# Patient Record
Sex: FEMALE | Race: WHITE | NOT HISPANIC OR LATINO | Employment: OTHER | URBAN - METROPOLITAN AREA
[De-identification: names, ages, dates, MRNs, and addresses within clinical notes are randomized per-mention and may not be internally consistent; named-entity substitution may affect disease eponyms.]

---

## 2017-01-04 ENCOUNTER — LAB (OUTPATIENT)
Dept: INTERNAL MEDICINE | Facility: CLINIC | Age: 57
End: 2017-01-04

## 2017-01-04 DIAGNOSIS — Z11.59 SCREENING FOR VIRAL DISEASE: ICD-10-CM

## 2017-01-04 DIAGNOSIS — Z13.29 SCREENING FOR ENDOCRINE DISORDER: ICD-10-CM

## 2017-01-04 DIAGNOSIS — Z00.00 ANNUAL PHYSICAL EXAM: ICD-10-CM

## 2017-01-04 DIAGNOSIS — R53.82 CHRONIC FATIGUE: ICD-10-CM

## 2017-01-04 LAB
ALBUMIN SERPL-MCNC: 3.91 G/DL (ref 3.4–4.6)
ALBUMIN/GLOB SERPL: 1.2 G/DL
ALP SERPL-CCNC: 70 U/L (ref 46–116)
ALT SERPL W P-5'-P-CCNC: 20 U/L (ref 14–59)
ANION GAP SERPL CALCULATED.3IONS-SCNC: 8 MMOL/L
AST SERPL-CCNC: 16 U/L (ref 7–37)
BASOPHILS # BLD AUTO: 0.07 10*3/MM3 (ref 0–0.2)
BASOPHILS NFR BLD AUTO: 0.7 % (ref 0–2)
BILIRUB SERPL-MCNC: 0.5 MG/DL (ref 0.2–1)
BUN BLD-MCNC: 16 MG/DL (ref 6–22)
BUN/CREAT SERPL: 21.3 (ref 7–25)
CALCIUM SPEC-SCNC: 9.6 MG/DL (ref 8.6–10.5)
CHLORIDE SERPL-SCNC: 101 MMOL/L (ref 95–107)
CHOLEST SERPL-MCNC: 239 MG/DL (ref 0–200)
CO2 SERPL-SCNC: 31 MMOL/L (ref 23–32)
CREAT BLD-MCNC: 0.75 MG/DL (ref 0.55–1.02)
DEPRECATED RDW RBC AUTO: 46.1 FL (ref 37–54)
EOSINOPHIL # BLD AUTO: 0.1 10*3/MM3 (ref 0–0.7)
EOSINOPHIL NFR BLD AUTO: 1.1 % (ref 0–5)
ERYTHROCYTE [DISTWIDTH] IN BLOOD BY AUTOMATED COUNT: 13.7 % (ref 11.5–15)
FERRITIN SERPL-MCNC: 114.7 NG/ML (ref 13–150)
GFR SERPL CREATININE-BSD FRML MDRD: 80 ML/MIN/1.73
GLOBULIN UR ELPH-MCNC: 3.2 GM/DL
GLUCOSE BLD-MCNC: 96 MG/DL (ref 70–100)
HCT VFR BLD AUTO: 43.6 % (ref 34.1–44.9)
HDLC SERPL-MCNC: 73 MG/DL (ref 40–81)
HGB BLD-MCNC: 14 G/DL (ref 11.2–15.7)
LDLC SERPL CALC-MCNC: 154 MG/DL (ref 0–100)
LDLC/HDLC SERPL: 2.11 {RATIO}
LYMPHOCYTES # BLD AUTO: 2.81 10*3/MM3 (ref 0.8–7)
LYMPHOCYTES NFR BLD AUTO: 29.8 % (ref 10–60)
MCH RBC QN AUTO: 30 PG (ref 26–34)
MCHC RBC AUTO-ENTMCNC: 32.1 G/DL (ref 31–37)
MCV RBC AUTO: 93.6 FL (ref 80–100)
MONOCYTES # BLD AUTO: 0.73 10*3/MM3 (ref 0–1)
MONOCYTES NFR BLD AUTO: 7.7 % (ref 0–13)
NEUTROPHILS # BLD AUTO: 5.72 10*3/MM3 (ref 1–11)
NEUTROPHILS NFR BLD AUTO: 60.7 % (ref 30–85)
PLATELET # BLD AUTO: 614 10*3/MM3 (ref 150–450)
PMV BLD AUTO: 11.4 FL (ref 6–12)
POTASSIUM BLD-SCNC: 4.8 MMOL/L (ref 3.3–5.3)
PROT SERPL-MCNC: 7.1 G/DL (ref 6.3–8.4)
RBC # BLD AUTO: 4.66 10*6/MM3 (ref 3.93–5.22)
SODIUM BLD-SCNC: 140 MMOL/L (ref 136–145)
TRIGL SERPL-MCNC: 59 MG/DL (ref 0–150)
TSH SERPL DL<=0.05 MIU/L-ACNC: 0.94 MIU/ML (ref 0.4–4.2)
VLDLC SERPL-MCNC: 11.8 MG/DL
WBC NRBC COR # BLD: 9.43 10*3/MM3 (ref 5–10)

## 2017-01-04 PROCEDURE — 84443 ASSAY THYROID STIM HORMONE: CPT | Performed by: INTERNAL MEDICINE

## 2017-01-04 PROCEDURE — 80053 COMPREHEN METABOLIC PANEL: CPT | Performed by: INTERNAL MEDICINE

## 2017-01-04 PROCEDURE — 85025 COMPLETE CBC W/AUTO DIFF WBC: CPT | Performed by: INTERNAL MEDICINE

## 2017-01-04 PROCEDURE — 80061 LIPID PANEL: CPT | Performed by: INTERNAL MEDICINE

## 2017-01-05 LAB
25(OH)D3 SERPL-MCNC: 75 NG/ML (ref 30–100)
HCV AB S/CO SERPL IA: <0.1 S/CO RATIO (ref 0–0.9)

## 2017-01-08 DIAGNOSIS — D75.839 THROMBOCYTOSIS: Primary | ICD-10-CM

## 2017-01-10 ENCOUNTER — TELEPHONE (OUTPATIENT)
Dept: INTERNAL MEDICINE | Facility: CLINIC | Age: 57
End: 2017-01-10

## 2017-01-10 NOTE — TELEPHONE ENCOUNTER
----- Message from Fadumo Anglin sent at 1/10/2017  3:55 PM EST -----  Contact: pt - Dr Young's pt - RE: lab appt  [facility] for pt to return call to schedule lab appt.

## 2017-02-03 ENCOUNTER — LAB (OUTPATIENT)
Dept: INTERNAL MEDICINE | Facility: CLINIC | Age: 57
End: 2017-02-03

## 2017-02-03 DIAGNOSIS — D75.839 THROMBOCYTOSIS: ICD-10-CM

## 2017-02-03 LAB
BASOPHILS # BLD AUTO: 0.07 10*3/MM3 (ref 0–0.2)
BASOPHILS NFR BLD AUTO: 1.1 % (ref 0–2)
DEPRECATED RDW RBC AUTO: 45.6 FL (ref 37–54)
EOSINOPHIL # BLD AUTO: 0.18 10*3/MM3 (ref 0–0.7)
EOSINOPHIL NFR BLD AUTO: 2.8 % (ref 0–5)
ERYTHROCYTE [DISTWIDTH] IN BLOOD BY AUTOMATED COUNT: 13.6 % (ref 11.5–15)
HCT VFR BLD AUTO: 42.7 % (ref 34.1–44.9)
HGB BLD-MCNC: 13.8 G/DL (ref 11.2–15.7)
LYMPHOCYTES # BLD AUTO: 2.44 10*3/MM3 (ref 0.8–7)
LYMPHOCYTES NFR BLD AUTO: 37.9 % (ref 10–60)
MCH RBC QN AUTO: 30.3 PG (ref 26–34)
MCHC RBC AUTO-ENTMCNC: 32.3 G/DL (ref 31–37)
MCV RBC AUTO: 93.8 FL (ref 80–100)
MONOCYTES # BLD AUTO: 0.81 10*3/MM3 (ref 0–1)
MONOCYTES NFR BLD AUTO: 12.6 % (ref 0–13)
NEUTROPHILS # BLD AUTO: 2.94 10*3/MM3 (ref 1–11)
NEUTROPHILS NFR BLD AUTO: 45.6 % (ref 30–85)
PLATELET # BLD AUTO: 621 10*3/MM3 (ref 150–450)
PMV BLD AUTO: 10.9 FL (ref 6–12)
RBC # BLD AUTO: 4.55 10*6/MM3 (ref 3.93–5.22)
WBC NRBC COR # BLD: 6.44 10*3/MM3 (ref 5–10)

## 2017-02-03 PROCEDURE — 36415 COLL VENOUS BLD VENIPUNCTURE: CPT | Performed by: INTERNAL MEDICINE

## 2017-02-03 PROCEDURE — 85025 COMPLETE CBC W/AUTO DIFF WBC: CPT | Performed by: INTERNAL MEDICINE

## 2017-02-09 ENCOUNTER — TELEPHONE (OUTPATIENT)
Dept: INTERNAL MEDICINE | Facility: CLINIC | Age: 57
End: 2017-02-09

## 2017-02-09 DIAGNOSIS — D75.839 THROMBOCYTOSIS: Primary | ICD-10-CM

## 2017-02-09 DIAGNOSIS — R79.89 HIGH PLATELET COUNT: Primary | ICD-10-CM

## 2017-02-09 NOTE — TELEPHONE ENCOUNTER
----- Message from Dorota Young MD sent at 2/9/2017 11:52 AM EST -----  Regarding: high platelet count  I left message for her to call back.  Platelets are high- she will need to see hematologist if this is much worse from her past.  Splenectomy may increase platelets - I need cbc from last several yrs to see if any change.

## 2017-02-10 NOTE — PROGRESS NOTES
She had platelet counts with dr gonzalez in past (oncology) - pls get those for comparison with recent cbc

## 2017-02-14 ENCOUNTER — TELEPHONE (OUTPATIENT)
Dept: INTERNAL MEDICINE | Facility: CLINIC | Age: 57
End: 2017-02-14

## 2017-02-14 NOTE — TELEPHONE ENCOUNTER
----- Message from Shavonne Gardner sent at 2/14/2017 11:15 AM EST -----  Contact: Patient  Patient called.  She states she talked with Dr. Young this weekend, and wanted to notify you that patient has no older CBCs.  Patient states Dr. Young told her to contact you this week when she was out of town about scheduling with a blood specialist.  Patient would like a return call from you.  Please advise.     Patient:  [phone]    Thanks.

## 2017-02-15 ENCOUNTER — TELEPHONE (OUTPATIENT)
Dept: INTERNAL MEDICINE | Facility: CLINIC | Age: 57
End: 2017-02-15

## 2017-02-15 NOTE — TELEPHONE ENCOUNTER
----- Message from Sari Cevallos sent at 2/15/2017  8:21 AM EST -----  Von-Pt returning your call. You were not available.  Pt said she cannot receive calls at her desk at work.  She will get her next break at 11:00 if you can call her then or she will call back.    Pt# [phone]

## 2017-02-15 NOTE — TELEPHONE ENCOUNTER
Ms. Dias returned my call back in regards to a message that Dr. Young left her  and she informed me that she had mailed some records to our office about two weeks ago with a handwritten note that contained some names of physicians she has seen along w/ other reports. I told her that I would speak to our medical records department in regards to this.     She informed me that she has been followed by some physicians at [facility]. I informed her that I will be able to retrieve these records via [facility] since the two hospitals have access to the same EHR system.     She stated she would like to be referred out to hematology (preferbably [facility]) for further evaluation due to the high platelet counts. I told her that we would georgina the referral in and get her set up to see one the providers there. She voiced she understood and thank you.

## 2017-03-08 DIAGNOSIS — R89.9 ABNORMAL LABORATORY TEST RESULT: Primary | ICD-10-CM

## 2017-03-10 ENCOUNTER — LAB (OUTPATIENT)
Dept: ONCOLOGY | Facility: HOSPITAL | Age: 57
End: 2017-03-10

## 2017-03-10 ENCOUNTER — CONSULT (OUTPATIENT)
Dept: ONCOLOGY | Facility: CLINIC | Age: 57
End: 2017-03-10

## 2017-03-10 VITALS
DIASTOLIC BLOOD PRESSURE: 66 MMHG | HEART RATE: 61 BPM | OXYGEN SATURATION: 99 % | TEMPERATURE: 97.7 F | HEIGHT: 66 IN | RESPIRATION RATE: 14 BRPM | WEIGHT: 141 LBS | SYSTOLIC BLOOD PRESSURE: 109 MMHG | BODY MASS INDEX: 22.66 KG/M2

## 2017-03-10 DIAGNOSIS — Z90.81 THROMBOCYTOSIS AFTER SPLENECTOMY: Primary | ICD-10-CM

## 2017-03-10 DIAGNOSIS — C50.919 PRIMARY MALIGNANT NEOPLASM OF FEMALE BREAST, UNSPECIFIED LATERALITY (HCC): ICD-10-CM

## 2017-03-10 DIAGNOSIS — D75.838 THROMBOCYTOSIS AFTER SPLENECTOMY: Primary | ICD-10-CM

## 2017-03-10 DIAGNOSIS — R89.9 ABNORMAL LABORATORY TEST RESULT: ICD-10-CM

## 2017-03-10 LAB
BASOPHILS # BLD AUTO: 0.11 10*3/MM3 (ref 0–0.2)
BASOPHILS NFR BLD AUTO: 1.7 % (ref 0–1.5)
DEPRECATED RDW RBC AUTO: 45.7 FL (ref 37–54)
EOSINOPHIL # BLD AUTO: 0.14 10*3/MM3 (ref 0–0.7)
EOSINOPHIL NFR BLD AUTO: 2.2 % (ref 0.3–6.2)
ERYTHROCYTE [DISTWIDTH] IN BLOOD BY AUTOMATED COUNT: 13.4 % (ref 11.7–13)
HCT VFR BLD AUTO: 40 % (ref 35.6–45.5)
HGB BLD-MCNC: 13.2 G/DL (ref 11.9–15.5)
HOLD SPECIMEN: NORMAL
IMM GRANULOCYTES # BLD: 0.01 10*3/MM3 (ref 0–0.03)
IMM GRANULOCYTES NFR BLD: 0.2 % (ref 0–0.5)
LYMPHOCYTES # BLD AUTO: 3.28 10*3/MM3 (ref 0.9–4.8)
LYMPHOCYTES NFR BLD AUTO: 50.7 % (ref 19.6–45.3)
MCH RBC QN AUTO: 30.5 PG (ref 26.9–32)
MCHC RBC AUTO-ENTMCNC: 33 G/DL (ref 32.4–36.3)
MCV RBC AUTO: 92.4 FL (ref 80.5–98.2)
MONOCYTES # BLD AUTO: 0.65 10*3/MM3 (ref 0.2–1.2)
MONOCYTES NFR BLD AUTO: 10 % (ref 5–12)
NEUTROPHILS # BLD AUTO: 2.28 10*3/MM3 (ref 1.9–8.1)
NEUTROPHILS NFR BLD AUTO: 35.2 % (ref 42.7–76)
NRBC BLD MANUAL-RTO: 0 /100 WBC (ref 0–0)
PLATELET # BLD AUTO: 595 10*3/MM3 (ref 140–500)
PMV BLD AUTO: 9.5 FL (ref 6–12)
RBC # BLD AUTO: 4.33 10*6/MM3 (ref 3.9–5.2)
WBC NRBC COR # BLD: 6.47 10*3/MM3 (ref 4.5–10.7)
WHOLE BLOOD HOLD SPECIMEN: NORMAL

## 2017-03-10 PROCEDURE — 81211: CPT | Performed by: INTERNAL MEDICINE

## 2017-03-10 PROCEDURE — 81162 BRCA1&2 GEN FULL SEQ DUP/DEL: CPT

## 2017-03-10 PROCEDURE — 99244 OFF/OP CNSLTJ NEW/EST MOD 40: CPT | Performed by: INTERNAL MEDICINE

## 2017-03-10 PROCEDURE — 36415 COLL VENOUS BLD VENIPUNCTURE: CPT

## 2017-03-10 PROCEDURE — 81213: CPT | Performed by: INTERNAL MEDICINE

## 2017-03-10 PROCEDURE — 85025 COMPLETE CBC W/AUTO DIFF WBC: CPT | Performed by: INTERNAL MEDICINE

## 2017-03-10 NOTE — PROGRESS NOTES
Subjective     REASON FOR CONSULTATION:   1.  Thrombocytosis  2.  History of splenectomy 2011  3.  Lobular carcinoma of the breast.  Patient status post bilateral mastectomies 2006.   Provide an opinion on any further workup or treatment                             REQUESTING PHYSICIAN:  Dorota Young MD    RECORDS OBTAINED:  Records of the patients history including those obtained from the referring provider were reviewed and summarized in detail.    HISTORY OF PRESENT ILLNESS:  The patient is a 56 y.o. year old female who is here for an opinion about the above issues.  She was primarily referred due to persistent thrombocytosis with platelet count running around 600,000-650,000 on recent lab work.  Her hemoglobin and white cells are unremarkable.  She has a history of prior splenectomy at time of pancreatic cyst surgery.  She has not had any history of abnormal bleeding or bruising no history of thrombosis stroke or heart attack.  In reviewing some of her older labs [facility] it appears her platelet count has been elevated in this range for at least 4 years and does not seem to be increasing therefore we feel fairly confident that this is a reactive thrombocytosis following her splenectomy rather than evidence of a myeloproliferative disorder.  We recommended the patient take a enteric coated 81 mg aspirin daily but otherwise she will not require any additional workup for her thrombocytosis.    She also has a history of lobular carcinoma the breast in 2006.  She underwent bilateral mastectomies at that time with Dr. Saud Venegas.  She had hormone receptor positive disease and took tamoxifen for 5 years.  She has not been tested for genetic forms of breast cancer and had questions about this today primarily as it may affect her offspring.  She does have some family history of breast cancer and uterine cancer therefore I think it is reasonable for her to be tested for BRCA1 and BRCA2.    History of  Present Illness     Past Medical History   Diagnosis Date   • Breast cancer 2006     RT Breast   • Depression    • Gluten intolerance    • History of blood in urine    • History of colon polyps    • History of infectious mononucleosis    • History of palpitations    • History of recurrent UTI (urinary tract infection)    • History of varicose veins    • Thrombocytosis         Past Surgical History   Procedure Laterality Date   • Splenectomy N/A 03/2010   • Pancreatic cyst excision N/A 03/2010   • Colonoscopy N/A 05/01/2012   • Dilatation and curettage N/A 10/26/2010   • Mastectomy Bilateral 07/01/2006   • Breast biopsy Right 2006        Current Outpatient Prescriptions on File Prior to Visit   Medication Sig Dispense Refill   • Cholecalciferol (VITAMIN D) 1000 UNITS tablet Take 1,000 Units by mouth Daily.     • PARoxetine (PAXIL) 10 MG tablet Take 10 mg by mouth Daily.     • [DISCONTINUED] Calcium-Vitamin D-Vitamin K (CALCIUM FOR WOMEN) 500-100-40 MG-UNT-MCG chewable tablet Chew 1 tablet Daily.     • [DISCONTINUED] Multiple Vitamin (MULTIVITAMIN) tablet Take 1 tablet by mouth Daily.     • [DISCONTINUED] raloxifene (EVISTA) 60 MG tablet Take 1 tablet by mouth Daily. 30 tablet 12     No current facility-administered medications on file prior to visit.         ALLERGIES:    Allergies   Allergen Reactions   • Morphine And Related Other (See Comments)     Low heart rate        Social History     Social History   • Marital status:      Spouse name: Michael   • Number of children: 2   • Years of education: N/A     Occupational History   •  Unknown Employer     Social History Main Topics   • Smoking status: Former Smoker     Years: 7.00     Types: Cigarettes     Quit date: 1/20/1985   • Smokeless tobacco: Never Used   • Alcohol use Yes      Comment: Moderate   • Drug use: No   • Sexual activity: Yes     Partners: Male     Other Topics Concern   • None     Social History Narrative        Family History   Problem  "Relation Age of Onset   • Uterine cancer Mother    • Tuberculosis Mother 19   • Dementia Mother    • COPD Father    • Skin cancer Father    • Heart disease Other 65   • Breast cancer Paternal Grandmother         Review of Systems   Constitutional: Negative for activity change, appetite change, fatigue, fever and unexpected weight change.   HENT: Negative for hearing loss, nosebleeds, trouble swallowing and voice change.    Eyes: Negative for visual disturbance.   Respiratory: Negative for cough, shortness of breath and wheezing.    Cardiovascular: Negative for chest pain and palpitations.   Gastrointestinal: Negative for abdominal pain, diarrhea, nausea and vomiting.   Genitourinary: Negative for difficulty urinating, frequency, hematuria and urgency.   Musculoskeletal: Negative for back pain and neck pain.   Skin: Negative for rash.   Neurological: Negative for dizziness, seizures, syncope and headaches.   Hematological: Negative for adenopathy. Does not bruise/bleed easily.   Psychiatric/Behavioral: Negative for behavioral problems. The patient is not nervous/anxious.         Objective     Vitals:    03/10/17 0917   BP: 109/66   Pulse: 61   Resp: 14   Temp: 97.7 °F (36.5 °C)   TempSrc: Oral   SpO2: 99%   Weight: 141 lb (64 kg)   Height: 66.14\" (168 cm)  Comment: NEW PT   PainSc: 0-No pain     Current Status 3/10/2017   ECOG score 0       Physical Exam   Constitutional: She is oriented to person, place, and time. She appears well-developed and well-nourished. No distress.   HENT:   Head: Normocephalic.   Eyes: Conjunctivae and EOM are normal. Pupils are equal, round, and reactive to light. No scleral icterus.   Neck: Normal range of motion. Neck supple. No JVD present. No thyromegaly present.   Cardiovascular: Normal rate and regular rhythm.  Exam reveals no gallop and no friction rub.    No murmur heard.  Pulmonary/Chest: Effort normal and breath sounds normal. She has no wheezes. She has no rales.   Abdominal: " Soft. She exhibits no distension and no mass. There is no tenderness.   Musculoskeletal: Normal range of motion. She exhibits no edema or deformity.   Lymphadenopathy:     She has no cervical adenopathy.   Neurological: She is alert and oriented to person, place, and time. She has normal reflexes. No cranial nerve deficit.   Skin: Skin is warm and dry. No rash noted. No erythema.   Psychiatric: She has a normal mood and affect. Her behavior is normal. Judgment normal.         RECENT LABS:  Hematology WBC   Date Value Ref Range Status   03/10/2017 6.47 4.50 - 10.70 10*3/mm3 Final     RBC   Date Value Ref Range Status   03/10/2017 4.33 3.90 - 5.20 10*6/mm3 Final     HEMOGLOBIN   Date Value Ref Range Status   03/10/2017 13.2 11.9 - 15.5 g/dL Final     HEMATOCRIT   Date Value Ref Range Status   03/10/2017 40.0 35.6 - 45.5 % Final     PLATELETS   Date Value Ref Range Status   03/10/2017 595 (H) 140 - 500 10*3/mm3 Final        Lab Results   Component Value Date    FERRITIN 114.70 01/04/2017       Assessment/Plan   1.  Chronic thrombocytosis following splenectomy.  2.  History of lobular carcinoma the right breast.  Patient underwent bilateral mastectomies and implant reconstruction and completed 5 years of tamoxifen therapy.  3.  Prior splenectomy in 2011 at the time of pancreatic cyst resection.  Patient reports she is staying up-to-date with her immunizations following splenectomy.    Recommendations  1.  We reassured the patient that her thrombocytosis is a chronic situation as result of her prior splenectomy and we see no signs of myeloproliferative process.  2.  We did recommend daily 81 mg aspirin.  3.  Due to her history of previous history of breast cancer prior to age 49 and her family history we ordered BRCA1 and BRCA2 testing.  4.  Have not scheduled routine follow-up in our office for this issue but instructed the patient to call us next month so that we can share the results of her genetic testing with her by  phone.    Thanks for allowing us to see this nice patient in consultation.

## 2017-03-15 ENCOUNTER — DOCUMENTATION (OUTPATIENT)
Dept: ONCOLOGY | Facility: CLINIC | Age: 57
End: 2017-03-15

## 2017-03-15 NOTE — PROGRESS NOTES
I was given a message from [facility] on patient regarding BRCA 1 and 2 testing that was initially for Dr. Harrison. They did not explain the nature of the phone call. I attempted to call back today as Dr. Harrison is on vacation and left a voicemail. Phone number for [facility] is: [phone].

## 2017-03-17 LAB — REQUEST PROBLEM: NORMAL

## 2017-03-24 LAB
LAB DIRECTOR NAME PROVIDER: NORMAL
PREAUTHORIZATION: NORMAL
RESULT: NORMAL
SPECIMEN PREPARATION: NORMAL
SPECIMEN SOURCE: NORMAL

## 2017-04-14 ENCOUNTER — OFFICE VISIT (OUTPATIENT)
Dept: INTERNAL MEDICINE | Facility: CLINIC | Age: 57
End: 2017-04-14

## 2017-04-14 VITALS
WEIGHT: 141.6 LBS | SYSTOLIC BLOOD PRESSURE: 118 MMHG | HEIGHT: 66 IN | BODY MASS INDEX: 22.76 KG/M2 | DIASTOLIC BLOOD PRESSURE: 78 MMHG

## 2017-04-14 DIAGNOSIS — F39 MOOD DISORDER (HCC): ICD-10-CM

## 2017-04-14 DIAGNOSIS — Z78.0 MENOPAUSE: ICD-10-CM

## 2017-04-14 DIAGNOSIS — D75.838 THROMBOCYTOSIS AFTER SPLENECTOMY: ICD-10-CM

## 2017-04-14 DIAGNOSIS — Z90.81 THROMBOCYTOSIS AFTER SPLENECTOMY: ICD-10-CM

## 2017-04-14 DIAGNOSIS — E78.5 HYPERLIPIDEMIA, UNSPECIFIED HYPERLIPIDEMIA TYPE: Primary | ICD-10-CM

## 2017-04-14 DIAGNOSIS — Z86.010 HISTORY OF COLON POLYPS: ICD-10-CM

## 2017-04-14 PROBLEM — Z86.0100 HISTORY OF COLON POLYPS: Status: ACTIVE | Noted: 2017-04-14

## 2017-04-14 PROCEDURE — 99213 OFFICE O/P EST LOW 20 MIN: CPT | Performed by: INTERNAL MEDICINE

## 2017-04-14 NOTE — PROGRESS NOTES
"Gaurav Dias is a 56 y.o. female here for   Chief Complaint   Patient presents with   • Mood Disorder     4 month follow-up   • Hyperlipidemia   .    Vitals:    04/14/17 1025   BP: 118/78   BP Location: Left arm   Patient Position: Sitting   Cuff Size: Adult   Weight: 141 lb 9.6 oz (64.2 kg)   Height: 66\" (167.6 cm)       Hyperlipidemia   This is a chronic problem. The current episode started more than 1 year ago. The problem is uncontrolled. Recent lipid tests were reviewed and are high. She has no history of chronic renal disease, diabetes, liver disease or obesity. Pertinent negatives include no chest pain or shortness of breath.        Encounter Diagnoses   Name Primary?   • Hyperlipidemia, unspecified hyperlipidemia type Yes   • Thrombocytosis after splenectomy    • History of colon polyps    • Mood disorder    • Menopause        The following portions of the patient's history were reviewed and updated as appropriate: allergies, current medications, past social history and problem list.    Review of Systems   Constitutional: Negative for chills, fatigue and fever.   Respiratory: Negative for cough, shortness of breath and wheezing.    Cardiovascular: Negative for chest pain, palpitations and leg swelling.   Psychiatric/Behavioral: Negative for dysphoric mood and sleep disturbance. The patient is not nervous/anxious.        Objective   Physical Exam   Constitutional: She appears well-developed and well-nourished. No distress.   Cardiovascular: Normal rate, regular rhythm and normal heart sounds.    Pulmonary/Chest: No respiratory distress. She has no wheezes. She has no rales. She exhibits no tenderness.   Musculoskeletal: She exhibits no edema.   Psychiatric: She has a normal mood and affect. Her behavior is normal.   Nursing note and vitals reviewed.      Assessment/Plan   Problem List Items Addressed This Visit        Unprioritized    Mood disorder    Thrombocytosis after splenectomy    History " of colon polyps    Hyperlipemia - Primary      Other Visit Diagnoses     Menopause        Relevant Orders    DEXA Bone Density Axial       High chol - need yearly chk.   High platelet count is from distant splenomegaly (seen 2017 by dr cesar-hematologist).    Mood stable with small dose paxil.  Call if problems.

## 2017-06-22 ENCOUNTER — CLINICAL SUPPORT (OUTPATIENT)
Dept: INTERNAL MEDICINE | Facility: CLINIC | Age: 57
End: 2017-06-22

## 2017-06-22 DIAGNOSIS — Z78.0 MENOPAUSE: ICD-10-CM

## 2017-06-22 PROCEDURE — 77080 DXA BONE DENSITY AXIAL: CPT | Performed by: INTERNAL MEDICINE

## 2017-08-16 ENCOUNTER — OFFICE VISIT (OUTPATIENT)
Dept: GASTROENTEROLOGY | Facility: CLINIC | Age: 57
End: 2017-08-16

## 2017-08-16 VITALS
WEIGHT: 149 LBS | HEIGHT: 66 IN | TEMPERATURE: 98.9 F | SYSTOLIC BLOOD PRESSURE: 116 MMHG | BODY MASS INDEX: 23.95 KG/M2 | DIASTOLIC BLOOD PRESSURE: 78 MMHG

## 2017-08-16 DIAGNOSIS — R14.0 ABDOMINAL BLOATING: Primary | ICD-10-CM

## 2017-08-16 PROCEDURE — 99214 OFFICE O/P EST MOD 30 MIN: CPT | Performed by: INTERNAL MEDICINE

## 2017-08-16 NOTE — PROGRESS NOTES
Chief Complaint   Patient presents with   • Bloated       History of Present Illness: 57 yo female with breast cancer and h/o pancreatic distal pancreatic resection - 2011. C/o abdominal bloating but no abdominal or chest pain. No nausea or vomiting. NO fevers, chills, night sweats. Weight is increasing. No diarrhea or constipaiton. No recctal bleeding or melena.     Past Medical History:   Diagnosis Date   • Breast cancer 2006    RT Breast   • Depression    • Gluten intolerance    • History of blood in urine    • History of colon polyps    • History of infectious mononucleosis    • History of palpitations    • History of recurrent UTI (urinary tract infection)    • History of varicose veins    • Thrombocytosis        Past Surgical History:   Procedure Laterality Date   • BREAST BIOPSY Right 2006   • COLONOSCOPY N/A 03/09/2012    polyps, [facility]PATH: tubulosvillous adenoma with low dysplasia   • COLONOSCOPY  09/11/2015    one 4mm polyp in the transverse colon, [facility]. PATH: Adenomatous polyp with low grade dsyplasia.    • DILATATION AND CURETTAGE N/A 10/26/2010   • ENDOSCOPY  03/09/2012    Erythema.  PATh: Patchy mild chronic gastritis   • MASTECTOMY Bilateral 07/01/2006   • PANCREATIC CYST EXCISION N/A 03/2010   • SPLENECTOMY N/A 03/2010         Current Outpatient Prescriptions:   •  Cholecalciferol (VITAMIN D) 1000 UNITS tablet, Take 1,000 Units by mouth Daily., Disp: , Rfl:   •  CRANBERRY PO, Take 1 tablet by mouth Daily., Disp: , Rfl:   •  MAGNESIUM PO, Take 1 tablet by mouth Daily., Disp: , Rfl:   •  PARoxetine (PAXIL) 10 MG tablet, Take 10 mg by mouth Daily., Disp: , Rfl:     Allergies   Allergen Reactions   • Morphine And Related Other (See Comments)     Low heart rate       Family History   Problem Relation Age of Onset   • Uterine cancer Mother    • Tuberculosis Mother 19   • Dementia Mother    • COPD Father    • Skin cancer Father    • Heart disease Other 65   • Breast cancer Paternal Grandmother        Social  History     Social History   • Marital status:      Spouse name: Michael   • Number of children: 2   • Years of education: N/A     Occupational History   •  Unknown Employer     Social History Main Topics   • Smoking status: Former Smoker     Years: 7.00     Types: Cigarettes     Quit date: 1/20/1985   • Smokeless tobacco: Never Used   • Alcohol use Yes      Comment: Moderate   • Drug use: No   • Sexual activity: Yes     Partners: Male     Other Topics Concern   • Not on file     Social History Narrative       Review of Systems   All other systems reviewed and are negative.      Vitals:    08/16/17 1536   BP: 116/78   Temp: 98.9 °F (37.2 °C)       Physical Exam   Constitutional: She is oriented to person, place, and time. She appears well-developed and well-nourished. No distress.   HENT:   Head: Normocephalic and atraumatic. Hair is normal.   Right Ear: Hearing, tympanic membrane, external ear and ear canal normal. No drainage. No decreased hearing is noted.   Left Ear: Hearing, tympanic membrane, external ear and ear canal normal. No decreased hearing is noted.   Nose: No nasal deformity.   Mouth/Throat: Oropharynx is clear and moist.   Eyes: Conjunctivae, EOM and lids are normal. Pupils are equal, round, and reactive to light. Right eye exhibits no discharge. Left eye exhibits no discharge.   Neck: Normal range of motion. Neck supple. No JVD present. No tracheal deviation present. No thyromegaly present.   Cardiovascular: Normal rate, regular rhythm, normal heart sounds, intact distal pulses and normal pulses.  Exam reveals no gallop and no friction rub.    No murmur heard.  Pulmonary/Chest: Effort normal and breath sounds normal. No respiratory distress. She has no wheezes. She has no rales. She exhibits no tenderness.   Abdominal: Soft. Bowel sounds are normal. She exhibits no distension and no mass. There is no tenderness. There is no rebound and no guarding. No hernia.   Genitourinary: Rectal exam shows  guaiac negative stool.   Genitourinary Comments: Ext hemorrhoids otherwise a normal anorectal exam.   Musculoskeletal: Normal range of motion. She exhibits no edema, tenderness or deformity.   Lymphadenopathy:     She has no cervical adenopathy.   Neurological: She is alert and oriented to person, place, and time. She has normal reflexes. She displays normal reflexes. No cranial nerve deficit. She exhibits normal muscle tone. Coordination normal.   Skin: Skin is warm and dry. No rash noted. She is not diaphoretic. No erythema.   Psychiatric: She has a normal mood and affect. Her behavior is normal. Judgment and thought content normal.   Vitals reviewed.      Shavonne was seen today for bloated.    Diagnoses and all orders for this visit:    Abdominal bloating  -     CT Abdomen Pelvis With & Without Contrast; Future    Assessment:  1) h/o colon polyps.  2) FH (dad) colon polyps  3) Celiac sprue-on a gluten free diet.  4) h/o pancreatic mass (IPMN) - s/p distal pancreatectomy with splenectomy.  5) Abdominal bloating.    Recommendations:  1) CT abd/pelvis with pancreatic protocol   2) Get labs from Dr. FRANCES Young'    No Follow-up on file.    Michael Maldonado MD  8/16/2017

## 2017-08-22 ENCOUNTER — TELEPHONE (OUTPATIENT)
Dept: GASTROENTEROLOGY | Facility: CLINIC | Age: 57
End: 2017-08-22

## 2017-08-22 DIAGNOSIS — D49.0 IPMN (INTRADUCTAL PAPILLARY MUCINOUS NEOPLASM): Primary | ICD-10-CM

## 2017-08-23 ENCOUNTER — HOSPITAL ENCOUNTER (OUTPATIENT)
Dept: CT IMAGING | Facility: HOSPITAL | Age: 57
Discharge: HOME | End: 2017-08-23
Attending: INTERNAL MEDICINE

## 2017-08-29 ENCOUNTER — TELEPHONE (OUTPATIENT)
Dept: GASTROENTEROLOGY | Facility: CLINIC | Age: 57
End: 2017-08-29

## 2017-08-29 NOTE — TELEPHONE ENCOUNTER
Received call from [facility] who advised that when they do the MRI of the abd it is done with and without contrast.  She advised she will correct the order and Dr Maldonado will have to cosign the order.  Advised would send message to Dr Maldonado.

## 2017-09-06 ENCOUNTER — TELEPHONE (OUTPATIENT)
Dept: GASTROENTEROLOGY | Facility: CLINIC | Age: 57
End: 2017-09-06

## 2017-09-06 NOTE — TELEPHONE ENCOUNTER
Please call Sandie from [facility] [place]. Tell her that the MRI of the of the abdomen is to be done with IV contrast per Dr. Quintana. I want to look at the pancreas and she has a h/o IPMN of the pancreas and had part of her pancreas resected in the past. Thx. kjh

## 2017-09-07 NOTE — TELEPHONE ENCOUNTER
Pt called and reports that Dr Maldonado had called her yesterday but she missed his call.  Advised the pt I would let him know.  Also advised the pt that her insurance so far is not wanting to cover for the mri.  Mala in our precert dept is waiting to hear from Dr Maldonado regarding this. Pt verb understanding.

## 2017-09-07 NOTE — TELEPHONE ENCOUNTER
SA Wilde I did call her insurance company yesterday and did get preauthorization for the MRI, JORGE milan

## 2017-09-07 NOTE — TELEPHONE ENCOUNTER
Called Liliana Villanueva  At [phone] and spoke with Sandie and advised per Dr Maldonado that he wants an mri of the abd to be done with IV contrast per Dr Quintana.  He wants to look at the pancreas and she has a hx of ipmn of the pancreas and had part of pancreas resected in the past.  She verb understanding.

## 2017-09-11 ENCOUNTER — HOSPITAL ENCOUNTER (OUTPATIENT)
Dept: MRI IMAGING | Facility: HOSPITAL | Age: 57
Discharge: HOME | End: 2017-09-11
Attending: INTERNAL MEDICINE | Admitting: INTERNAL MEDICINE

## 2017-09-11 DIAGNOSIS — D49.0 IPMN (INTRADUCTAL PAPILLARY MUCINOUS NEOPLASM): ICD-10-CM

## 2017-09-11 PROCEDURE — 0 GADOBENATE DIMEGLUMINE 529 MG/ML SOLUTION: Performed by: INTERNAL MEDICINE

## 2017-09-11 PROCEDURE — A9577 INJ MULTIHANCE: HCPCS | Performed by: INTERNAL MEDICINE

## 2017-09-11 PROCEDURE — 74183 MRI ABD W/O CNTR FLWD CNTR: CPT

## 2017-09-11 RX ADMIN — GADOBENATE DIMEGLUMINE 13 ML: 529 INJECTION, SOLUTION INTRAVENOUS at 09:45

## 2017-10-02 ENCOUNTER — TELEPHONE (OUTPATIENT)
Dept: GASTROENTEROLOGY | Facility: CLINIC | Age: 57
End: 2017-10-02

## 2017-10-02 NOTE — TELEPHONE ENCOUNTER
----- Message from Michael Maldonado MD sent at 10/2/2017  7:37 AM EDT -----  Tell her that her MRI of the abdomen showed that she has had part of her pancreas removed and her spleen removed. No other abnormalities seen.  If she wants to discuss the bloating that she has then have her f/u with me in the office. kayli

## 2017-10-03 NOTE — TELEPHONE ENCOUNTER
Call returned to pt -  left as per request.  Advise per Dr Maldonado that MRI of the abd showed has had part of pancreas removed, and spleen removed. No other abn seen.  If wants to discuss bloating, f/u with DR Lang in office.  Contact office if any questions.

## 2017-10-26 ENCOUNTER — OFFICE VISIT (OUTPATIENT)
Dept: INTERNAL MEDICINE | Facility: CLINIC | Age: 57
End: 2017-10-26

## 2017-10-26 VITALS
SYSTOLIC BLOOD PRESSURE: 124 MMHG | DIASTOLIC BLOOD PRESSURE: 74 MMHG | BODY MASS INDEX: 22.82 KG/M2 | WEIGHT: 142 LBS | HEIGHT: 66 IN

## 2017-10-26 DIAGNOSIS — F39 MOOD DISORDER (HCC): ICD-10-CM

## 2017-10-26 DIAGNOSIS — E78.5 HYPERLIPIDEMIA, UNSPECIFIED HYPERLIPIDEMIA TYPE: Primary | ICD-10-CM

## 2017-10-26 DIAGNOSIS — Z86.010 HISTORY OF COLON POLYPS: ICD-10-CM

## 2017-10-26 PROCEDURE — 99214 OFFICE O/P EST MOD 30 MIN: CPT | Performed by: INTERNAL MEDICINE

## 2017-10-26 RX ORDER — PAROXETINE HYDROCHLORIDE 20 MG/1
20 TABLET, FILM COATED ORAL DAILY
Qty: 90 TABLET | Refills: 3 | Status: SHIPPED | OUTPATIENT
Start: 2017-10-26 | End: 2018-10-03 | Stop reason: SDUPTHER

## 2017-10-26 NOTE — PROGRESS NOTES
"Gaurav Dias is a 57 y.o. female here for   Chief Complaint   Patient presents with   • Hyperlipidemia   • Depression   .    Vitals:    10/26/17 0831   BP: 124/74   BP Location: Left arm   Patient Position: Sitting   Cuff Size: Adult   Weight: 142 lb (64.4 kg)   Height: 66\" (167.6 cm)       Body mass index is 22.92 kg/(m^2).    Hyperlipidemia   This is a chronic problem. The current episode started more than 1 year ago. The problem is controlled. Recent lipid tests were reviewed and are normal. She has no history of chronic renal disease, diabetes, hypothyroidism, liver disease or obesity. Pertinent negatives include no chest pain or shortness of breath.   Depression   Visit Type: follow-up  Patient presents with the following symptoms: depressed mood and nervousness/anxiety.  Patient is not experiencing: palpitations, panic, shortness of breath and suicidal ideas.         The following portions of the patient's history were reviewed and updated as appropriate: allergies, current medications, past social history and problem list.    Review of Systems   Constitutional: Positive for fatigue. Negative for chills and fever.   Respiratory: Negative for cough, shortness of breath and wheezing.    Cardiovascular: Negative for chest pain, palpitations and leg swelling.   Psychiatric/Behavioral: Positive for dysphoric mood. Negative for sleep disturbance and suicidal ideas. The patient is nervous/anxious.        Objective   Physical Exam   Constitutional: She appears well-developed and well-nourished. No distress.   Cardiovascular: Normal rate, regular rhythm and normal heart sounds.    Pulmonary/Chest: No respiratory distress. She has no wheezes. She has no rales. She exhibits no tenderness.   Musculoskeletal: She exhibits no edema.   Psychiatric: She has a normal mood and affect. Her behavior is normal.   Nursing note and vitals reviewed.      Assessment/Plan   Diagnoses and all orders for this " visit:    Hyperlipidemia, unspecified hyperlipidemia type  Comments:  continue healthy diet & daily exercise    History of colon polyps  Comments:  need c-scope q 5 yrs    Mood disorder  Comments:  worse b/c job change - trial of increased paxil - will change to stronger med if no better in 1 mo  Orders:  -     PARoxetine (PAXIL) 20 MG tablet; Take 1 tablet by mouth Daily.          Return 4-6 wks.

## 2017-11-02 ENCOUNTER — TELEPHONE (OUTPATIENT)
Dept: INTERNAL MEDICINE | Facility: CLINIC | Age: 57
End: 2017-11-02

## 2017-11-02 DIAGNOSIS — N30.00 ACUTE CYSTITIS WITHOUT HEMATURIA: Primary | ICD-10-CM

## 2017-11-02 RX ORDER — SULFAMETHOXAZOLE AND TRIMETHOPRIM 800; 160 MG/1; MG/1
1 TABLET ORAL 2 TIMES DAILY
Qty: 10 TABLET | Refills: 0 | Status: SHIPPED | OUTPATIENT
Start: 2017-11-02 | End: 2018-08-01

## 2017-11-02 RX ORDER — PHENAZOPYRIDINE HYDROCHLORIDE 200 MG/1
200 TABLET, FILM COATED ORAL 3 TIMES DAILY PRN
Qty: 6 TABLET | Refills: 0 | Status: SHIPPED | OUTPATIENT
Start: 2017-11-02 | End: 2018-08-01

## 2017-11-02 NOTE — TELEPHONE ENCOUNTER
----- Message from Mariposa Anderson MA sent at 11/2/2017  8:03 AM EDT -----  Pt calling for abx to treat UTI-she wants Pyridium for pain as well. Will come in if necessary    Zbigniewt # [phone]    Pt#[phone]

## 2018-03-31 PROBLEM — J06.9 VIRAL UPPER RESPIRATORY INFECTION: Status: ACTIVE | Noted: 2018-03-31

## 2018-08-01 ENCOUNTER — OFFICE VISIT (OUTPATIENT)
Dept: INTERNAL MEDICINE | Facility: CLINIC | Age: 58
End: 2018-08-01

## 2018-08-01 VITALS
SYSTOLIC BLOOD PRESSURE: 122 MMHG | HEIGHT: 66 IN | WEIGHT: 153 LBS | DIASTOLIC BLOOD PRESSURE: 92 MMHG | BODY MASS INDEX: 24.59 KG/M2

## 2018-08-01 DIAGNOSIS — E78.49 OTHER HYPERLIPIDEMIA: Primary | ICD-10-CM

## 2018-08-01 DIAGNOSIS — K59.01 SLOW TRANSIT CONSTIPATION: ICD-10-CM

## 2018-08-01 DIAGNOSIS — K90.41 GLUTEN INTOLERANCE: ICD-10-CM

## 2018-08-01 DIAGNOSIS — Z86.010 HISTORY OF COLON POLYPS: ICD-10-CM

## 2018-08-01 DIAGNOSIS — F39 MOOD DISORDER (HCC): ICD-10-CM

## 2018-08-01 DIAGNOSIS — R53.82 CHRONIC FATIGUE: ICD-10-CM

## 2018-08-01 LAB
ALBUMIN SERPL-MCNC: 4.5 G/DL (ref 3.5–5.2)
ALBUMIN/GLOB SERPL: 1.7 G/DL
ALP SERPL-CCNC: 76 U/L (ref 39–117)
ALT SERPL-CCNC: 16 U/L (ref 1–33)
AST SERPL-CCNC: 19 U/L (ref 1–32)
BASOPHILS # BLD AUTO: 0.11 10*3/MM3 (ref 0–0.2)
BASOPHILS NFR BLD AUTO: 1.4 % (ref 0–2)
BILIRUB SERPL-MCNC: 0.4 MG/DL (ref 0.1–1.2)
BILIRUB UR QL STRIP: NEGATIVE
BUN SERPL-MCNC: 13 MG/DL (ref 6–20)
BUN/CREAT SERPL: 14.9 (ref 7–25)
CALCIUM SERPL-MCNC: 9.8 MG/DL (ref 8.6–10.5)
CHLORIDE SERPL-SCNC: 101 MMOL/L (ref 98–107)
CHOLEST SERPL-MCNC: 257 MG/DL (ref 0–200)
CLARITY UR: CLEAR
CO2 SERPL-SCNC: 27.4 MMOL/L (ref 22–29)
COLOR UR: YELLOW
CREAT SERPL-MCNC: 0.87 MG/DL (ref 0.57–1)
DEPRECATED RDW RBC AUTO: 45.6 FL (ref 37–54)
EOSINOPHIL # BLD AUTO: 0.2 10*3/MM3 (ref 0–0.7)
EOSINOPHIL NFR BLD AUTO: 2.6 % (ref 0–5)
ERYTHROCYTE [DISTWIDTH] IN BLOOD BY AUTOMATED COUNT: 13.9 % (ref 11.5–15)
FOLATE SERPL-MCNC: 4.95 NG/ML (ref 4.78–24.2)
GLOBULIN SER CALC-MCNC: 2.6 GM/DL
GLUCOSE SERPL-MCNC: 103 MG/DL (ref 65–99)
GLUCOSE UR STRIP-MCNC: NEGATIVE MG/DL
HCT VFR BLD AUTO: 44.9 % (ref 34.1–44.9)
HDLC SERPL-MCNC: 65 MG/DL (ref 40–60)
HGB BLD-MCNC: 15 G/DL (ref 11.2–15.7)
HGB UR QL STRIP.AUTO: NEGATIVE
KETONES UR QL STRIP: NEGATIVE
LDLC SERPL CALC-MCNC: 172 MG/DL (ref 0–100)
LEUKOCYTE ESTERASE UR QL STRIP.AUTO: NEGATIVE
LYMPHOCYTES # BLD AUTO: 3.17 10*3/MM3 (ref 0.8–7)
LYMPHOCYTES NFR BLD AUTO: 40.6 % (ref 10–60)
MCH RBC QN AUTO: 30.4 PG (ref 26–34)
MCHC RBC AUTO-ENTMCNC: 33.4 G/DL (ref 31–37)
MCV RBC AUTO: 91.1 FL (ref 80–100)
MONOCYTES # BLD AUTO: 0.79 10*3/MM3 (ref 0–1)
MONOCYTES NFR BLD AUTO: 10.1 % (ref 0–13)
NEUTROPHILS # BLD AUTO: 3.53 10*3/MM3 (ref 1–11)
NEUTROPHILS NFR BLD AUTO: 45.3 % (ref 30–85)
NITRITE UR QL STRIP: NEGATIVE
PH UR STRIP.AUTO: 7 [PH] (ref 5–8)
PLATELET # BLD AUTO: 640 10*3/MM3 (ref 150–450)
PMV BLD AUTO: 10.7 FL (ref 6–12)
POTASSIUM SERPL-SCNC: 5.2 MMOL/L (ref 3.5–5.2)
PROT SERPL-MCNC: 7.1 G/DL (ref 6–8.5)
PROT UR QL STRIP: NEGATIVE
RBC # BLD AUTO: 4.93 10*6/MM3 (ref 3.93–5.22)
SODIUM SERPL-SCNC: 141 MMOL/L (ref 136–145)
SP GR UR STRIP: 1.01 (ref 1–1.03)
TRIGL SERPL-MCNC: 100 MG/DL (ref 0–150)
TSH SERPL-ACNC: 2.06 MIU/ML (ref 0.27–4.2)
UROBILINOGEN UR QL STRIP: NORMAL
VIT B12 SERPL-MCNC: 665 PG/ML (ref 211–946)
VLDLC SERPL-MCNC: 20 MG/DL (ref 5–40)
WBC NRBC COR # BLD: 7.8 10*3/MM3 (ref 5–10)

## 2018-08-01 PROCEDURE — 99214 OFFICE O/P EST MOD 30 MIN: CPT | Performed by: INTERNAL MEDICINE

## 2018-08-01 PROCEDURE — 81003 URINALYSIS AUTO W/O SCOPE: CPT | Performed by: INTERNAL MEDICINE

## 2018-08-01 PROCEDURE — 85025 COMPLETE CBC W/AUTO DIFF WBC: CPT | Performed by: INTERNAL MEDICINE

## 2018-08-01 RX ORDER — BUPROPION HYDROCHLORIDE 150 MG/1
150 TABLET ORAL DAILY
Qty: 30 TABLET | Refills: 6 | Status: SHIPPED | OUTPATIENT
Start: 2018-08-01 | End: 2018-10-22 | Stop reason: SDUPTHER

## 2018-08-01 NOTE — PROGRESS NOTES
"Gaurav Dias is a 57 y.o. female here for   Chief Complaint   Patient presents with   • Weight Gain   • Fatigue   • Hyperlipidemia   .    Vitals:    08/01/18 0844   BP: 122/92   BP Location: Left arm   Patient Position: Sitting   Cuff Size: Adult   Weight: 69.4 kg (153 lb)   Height: 167.6 cm (66\")       Body mass index is 24.69 kg/m².    Fatigue   This is a chronic problem. The problem occurs constantly. The problem has been gradually worsening. Associated symptoms include fatigue. Pertinent negatives include no abdominal pain, chest pain, chills, coughing, fever, nausea or vomiting.   Hyperlipidemia   This is a chronic problem. The current episode started more than 1 year ago. The problem is controlled. Recent lipid tests were reviewed and are normal. She has no history of diabetes. Pertinent negatives include no chest pain or shortness of breath.        The following portions of the patient's history were reviewed and updated as appropriate: allergies, current medications, past social history and problem list.    Review of Systems   Constitutional: Positive for fatigue. Negative for chills and fever.   Respiratory: Negative for cough, shortness of breath and wheezing.    Cardiovascular: Negative for chest pain, palpitations and leg swelling.   Gastrointestinal: Positive for abdominal distention (off/on) and constipation. Negative for abdominal pain, blood in stool, diarrhea, nausea and vomiting.   Psychiatric/Behavioral: Negative for dysphoric mood and sleep disturbance. The patient is not nervous/anxious.        Objective   Physical Exam   Constitutional: She appears well-developed and well-nourished. No distress.   Cardiovascular: Normal rate, regular rhythm and normal heart sounds.    Pulmonary/Chest: No respiratory distress. She has no wheezes. She has no rales. She exhibits no tenderness.   Abdominal: Soft. She exhibits no distension and no mass. There is no tenderness. There is no rebound and " no guarding. No hernia.   Musculoskeletal: She exhibits no edema.   Psychiatric: She has a normal mood and affect. Her behavior is normal.   Nursing note and vitals reviewed.      Assessment/Plan   Diagnoses and all orders for this visit:    Other hyperlipidemia  Comments:  continue diet/ex  Orders:  -     Comprehensive Metabolic Panel; Future  -     Lipid Panel; Future    History of colon polyps  Comments:  need f/u with GI    Mood disorder (CMS/HCC)  Comments:  trial of adding wellbutrin to paxil - call if problems  Orders:  -     buPROPion XL (WELLBUTRIN XL) 150 MG 24 hr tablet; Take 1 tablet by mouth Daily. In addition to paxil    Chronic fatigue  Comments:  likely from paxil - may need to change to lexapro - need incr exercise  Orders:  -     CBC Auto Differential; Future  -     TSH Rfx On Abnormal To Free T4; Future  -     Urinalysis With Microscopic If Indicated (No Culture) - Urine, Clean Catch; Future  -     Vitamin B12 & Folate; Future    Slow transit constipation  Comments:  need daily metamucil &/-stool softener - f/u with GI yearly    Gluten intolerance  Comments:  need low carb diet

## 2018-08-06 NOTE — PROGRESS NOTES
High cholesterol/fat (& slightly high sugar) - need low fat/sugar diet & more exercise.  Consider chol medication - call if ready.  Thyroid & B vitamins normal. Platelets still high - recheck several mos.

## 2018-10-03 DIAGNOSIS — F39 MOOD DISORDER (HCC): ICD-10-CM

## 2018-10-03 RX ORDER — PAROXETINE HYDROCHLORIDE 20 MG/1
TABLET, FILM COATED ORAL
Qty: 90 TABLET | Refills: 3 | OUTPATIENT
Start: 2018-10-03 | End: 2019-01-03

## 2018-10-17 NOTE — TELEPHONE ENCOUNTER
Last office visit with Kristen Kehr, PA-C 3/2017.  Will route to care team at OB.  Audra Ureña RN      Received call from [facility] from [email or web address] asking if for the mri with pancreatic protocol , if Dr Maldonado is wanting a MRCP do see the ducts as well or did he just want the MRI to check on the liver.  Advised would send message to Dr Maldonado.   Her call back number is [phone].  Pt is scheduled for Monday 09/11.

## 2018-10-22 ENCOUNTER — TELEPHONE (OUTPATIENT)
Dept: INTERNAL MEDICINE | Facility: CLINIC | Age: 58
End: 2018-10-22

## 2018-10-22 DIAGNOSIS — F39 MOOD DISORDER (HCC): ICD-10-CM

## 2018-10-22 RX ORDER — BUPROPION HYDROCHLORIDE 150 MG/1
150 TABLET ORAL DAILY
Qty: 90 TABLET | Refills: 1 | Status: SHIPPED | OUTPATIENT
Start: 2018-10-22 | End: 2019-07-05 | Stop reason: SDUPTHER

## 2018-10-22 NOTE — TELEPHONE ENCOUNTER
----- Message from Fadumo Anglin sent at 10/22/2018  2:11 PM EDT -----  Contact: pt - Dr Young's pt - RE: Rx refill  Pt calling and would like a refill on Rx      buPROPion XL (WELLBUTRIN XL) 150 MG 24 hr tablet 30 tablet     Sig - Route: Take 1 tablet by mouth Daily. In addition to paxil - Oral     [facility] - [address] [phone] [facility] [phone] FX    Pt # [phone]

## 2018-12-17 DIAGNOSIS — D75.838 THROMBOCYTOSIS AFTER SPLENECTOMY: ICD-10-CM

## 2018-12-17 DIAGNOSIS — Z90.81 THROMBOCYTOSIS AFTER SPLENECTOMY: ICD-10-CM

## 2018-12-17 DIAGNOSIS — E78.49 OTHER HYPERLIPIDEMIA: Primary | ICD-10-CM

## 2018-12-17 DIAGNOSIS — R53.82 CHRONIC FATIGUE: ICD-10-CM

## 2019-06-21 ENCOUNTER — TELEPHONE (OUTPATIENT)
Dept: INTERNAL MEDICINE | Facility: CLINIC | Age: 59
End: 2019-06-21

## 2019-07-05 ENCOUNTER — OFFICE VISIT (OUTPATIENT)
Dept: INTERNAL MEDICINE | Facility: CLINIC | Age: 59
End: 2019-07-05

## 2019-07-05 VITALS
HEART RATE: 77 BPM | OXYGEN SATURATION: 98 % | SYSTOLIC BLOOD PRESSURE: 124 MMHG | HEIGHT: 66 IN | WEIGHT: 154.8 LBS | BODY MASS INDEX: 24.88 KG/M2 | DIASTOLIC BLOOD PRESSURE: 74 MMHG

## 2019-07-05 DIAGNOSIS — Z90.81 THROMBOCYTOSIS AFTER SPLENECTOMY: ICD-10-CM

## 2019-07-05 DIAGNOSIS — F33.9 MONOPOLAR DEPRESSION (HCC): ICD-10-CM

## 2019-07-05 DIAGNOSIS — E78.49 OTHER HYPERLIPIDEMIA: ICD-10-CM

## 2019-07-05 DIAGNOSIS — Z78.0 MENOPAUSE: ICD-10-CM

## 2019-07-05 DIAGNOSIS — D75.838 THROMBOCYTOSIS AFTER SPLENECTOMY: ICD-10-CM

## 2019-07-05 DIAGNOSIS — F41.9 ANXIETY: ICD-10-CM

## 2019-07-05 DIAGNOSIS — J30.9 ALLERGIC RHINITIS, UNSPECIFIED SEASONALITY, UNSPECIFIED TRIGGER: ICD-10-CM

## 2019-07-05 DIAGNOSIS — G89.29 CHRONIC MIDLINE LOW BACK PAIN WITHOUT SCIATICA: ICD-10-CM

## 2019-07-05 DIAGNOSIS — Z00.00 ANNUAL PHYSICAL EXAM: Primary | ICD-10-CM

## 2019-07-05 DIAGNOSIS — Z82.49 FAMILY HISTORY OF HEART DISEASE: ICD-10-CM

## 2019-07-05 DIAGNOSIS — F39 MOOD DISORDER (HCC): ICD-10-CM

## 2019-07-05 DIAGNOSIS — R53.82 CHRONIC FATIGUE: ICD-10-CM

## 2019-07-05 DIAGNOSIS — M54.50 CHRONIC MIDLINE LOW BACK PAIN WITHOUT SCIATICA: ICD-10-CM

## 2019-07-05 PROBLEM — J06.9 VIRAL UPPER RESPIRATORY INFECTION: Status: RESOLVED | Noted: 2018-03-31 | Resolved: 2019-07-05

## 2019-07-05 PROCEDURE — 90732 PPSV23 VACC 2 YRS+ SUBQ/IM: CPT | Performed by: INTERNAL MEDICINE

## 2019-07-05 PROCEDURE — 90471 IMMUNIZATION ADMIN: CPT | Performed by: INTERNAL MEDICINE

## 2019-07-05 PROCEDURE — 99396 PREV VISIT EST AGE 40-64: CPT | Performed by: INTERNAL MEDICINE

## 2019-07-05 RX ORDER — BUPROPION HYDROCHLORIDE 150 MG/1
150 TABLET ORAL DAILY
Qty: 90 TABLET | Refills: 2 | Status: SHIPPED | OUTPATIENT
Start: 2019-07-05 | End: 2020-03-31

## 2019-07-05 NOTE — PATIENT INSTRUCTIONS
Health Maintenance, Female  Adopting a healthy lifestyle and getting preventive care can go a long way to promote health and wellness. Talk with your health care provider about what schedule of regular examinations is right for you. This is a good chance for you to check in with your provider about disease prevention and staying healthy.  In between checkups, there are plenty of things you can do on your own. Experts have done a lot of research about which lifestyle changes and preventive measures are most likely to keep you healthy. Ask your health care provider for more information.  Weight and diet  Eat a healthy diet  · Be sure to include plenty of vegetables, fruits, low-fat dairy products, and lean protein.  · Do not eat a lot of foods high in solid fats, added sugars, or salt.  · Get regular exercise. This is one of the most important things you can do for your health.  ? Most adults should exercise for at least 150 minutes each week. The exercise should increase your heart rate and make you sweat (moderate-intensity exercise).  ? Most adults should also do strengthening exercises at least twice a week. This is in addition to the moderate-intensity exercise.    Maintain a healthy weight  · Body mass index (BMI) is a measurement that can be used to identify possible weight problems. It estimates body fat based on height and weight. Your health care provider can help determine your BMI and help you achieve or maintain a healthy weight.  · For females 20 years of age and older:  ? A BMI below 18.5 is considered underweight.  ? A BMI of 18.5 to 24.9 is normal.  ? A BMI of 25 to 29.9 is considered overweight.  ? A BMI of 30 and above is considered obese.    Watch levels of cholesterol and blood lipids  · You should start having your blood tested for lipids and cholesterol at 20 years of age, then have this test every 5 years.  · You may need to have your cholesterol levels checked more often if:  ? Your lipid or  cholesterol levels are high.  ? You are older than 50 years of age.  ? You are at high risk for heart disease.    Cancer screening  Lung Cancer  · Lung cancer screening is recommended for adults 55-80 years old who are at high risk for lung cancer because of a history of smoking.  · A yearly low-dose CT scan of the lungs is recommended for people who:  ? Currently smoke.  ? Have quit within the past 15 years.  ? Have at least a 30-pack-year history of smoking. A pack year is smoking an average of one pack of cigarettes a day for 1 year.  · Yearly screening should continue until it has been 15 years since you quit.  · Yearly screening should stop if you develop a health problem that would prevent you from having lung cancer treatment.    Breast Cancer  · Practice breast self-awareness. This means understanding how your breasts normally appear and feel.  · It also means doing regular breast self-exams. Let your health care provider know about any changes, no matter how small.  · If you are in your 20s or 30s, you should have a clinical breast exam (CBE) by a health care provider every 1-3 years as part of a regular health exam.  · If you are 40 or older, have a CBE every year. Also consider having a breast X-ray (mammogram) every year.  · If you have a family history of breast cancer, talk to your health care provider about genetic screening.  · If you are at high risk for breast cancer, talk to your health care provider about having an MRI and a mammogram every year.  · Breast cancer gene (BRCA) assessment is recommended for women who have family members with BRCA-related cancers. BRCA-related cancers include:  ? Breast.  ? Ovarian.  ? Tubal.  ? Peritoneal cancers.  · Results of the assessment will determine the need for genetic counseling and BRCA1 and BRCA2 testing.    Cervical Cancer  Your health care provider may recommend that you be screened regularly for cancer of the pelvic organs (ovaries, uterus, and  vagina). This screening involves a pelvic examination, including checking for microscopic changes to the surface of your cervix (Pap test). You may be encouraged to have this screening done every 3 years, beginning at age 21.  · For women ages 30-65, health care providers may recommend pelvic exams and Pap testing every 3 years, or they may recommend the Pap and pelvic exam, combined with testing for human papilloma virus (HPV), every 5 years. Some types of HPV increase your risk of cervical cancer. Testing for HPV may also be done on women of any age with unclear Pap test results.  · Other health care providers may not recommend any screening for nonpregnant women who are considered low risk for pelvic cancer and who do not have symptoms. Ask your health care provider if a screening pelvic exam is right for you.  · If you have had past treatment for cervical cancer or a condition that could lead to cancer, you need Pap tests and screening for cancer for at least 20 years after your treatment. If Pap tests have been discontinued, your risk factors (such as having a new sexual partner) need to be reassessed to determine if screening should resume. Some women have medical problems that increase the chance of getting cervical cancer. In these cases, your health care provider may recommend more frequent screening and Pap tests.    Colorectal Cancer  · This type of cancer can be detected and often prevented.  · Routine colorectal cancer screening usually begins at 50 years of age and continues through 75 years of age.  · Your health care provider may recommend screening at an earlier age if you have risk factors for colon cancer.  · Your health care provider may also recommend using home test kits to check for hidden blood in the stool.  · A small camera at the end of a tube can be used to examine your colon directly (sigmoidoscopy or colonoscopy). This is done to check for the earliest forms of colorectal  cancer.  · Routine screening usually begins at age 50.  · Direct examination of the colon should be repeated every 5-10 years through 75 years of age. However, you may need to be screened more often if early forms of precancerous polyps or small growths are found.    Skin Cancer  · Check your skin from head to toe regularly.  · Tell your health care provider about any new moles or changes in moles, especially if there is a change in a mole's shape or color.  · Also tell your health care provider if you have a mole that is larger than the size of a pencil eraser.  · Always use sunscreen. Apply sunscreen liberally and repeatedly throughout the day.  · Protect yourself by wearing long sleeves, pants, a wide-brimmed hat, and sunglasses whenever you are outside.    Heart disease, diabetes, and high blood pressure  · High blood pressure causes heart disease and increases the risk of stroke. High blood pressure is more likely to develop in:  ? People who have blood pressure in the high end of the normal range (130-139/85-89 mm Hg).  ? People who are overweight or obese.  ? People who are .  · If you are 18-39 years of age, have your blood pressure checked every 3-5 years. If you are 40 years of age or older, have your blood pressure checked every year. You should have your blood pressure measured twice--once when you are at a hospital or clinic, and once when you are not at a hospital or clinic. Record the average of the two measurements. To check your blood pressure when you are not at a hospital or clinic, you can use:  ? An automated blood pressure machine at a pharmacy.  ? A home blood pressure monitor.  · If you are between 55 years and 79 years old, ask your health care provider if you should take aspirin to prevent strokes.  · Have regular diabetes screenings. This involves taking a blood sample to check your fasting blood sugar level.  ? If you are at a normal weight and have a low risk for  diabetes, have this test once every three years after 45 years of age.  ? If you are overweight and have a high risk for diabetes, consider being tested at a younger age or more often.  Preventing infection  Hepatitis B  · If you have a higher risk for hepatitis B, you should be screened for this virus. You are considered at high risk for hepatitis B if:  ? You were born in a country where hepatitis B is common. Ask your health care provider which countries are considered high risk.  ? Your parents were born in a high-risk country, and you have not been immunized against hepatitis B (hepatitis B vaccine).  ? You have HIV or AIDS.  ? You use needles to inject street drugs.  ? You live with someone who has hepatitis B.  ? You have had sex with someone who has hepatitis B.  ? You get hemodialysis treatment.  ? You take certain medicines for conditions, including cancer, organ transplantation, and autoimmune conditions.    Hepatitis C  · Blood testing is recommended for:  ? Everyone born from 1945 through 1965.  ? Anyone with known risk factors for hepatitis C.    Sexually transmitted infections (STIs)  · You should be screened for sexually transmitted infections (STIs) including gonorrhea and chlamydia if:  ? You are sexually active and are younger than 24 years of age.  ? You are older than 24 years of age and your health care provider tells you that you are at risk for this type of infection.  ? Your sexual activity has changed since you were last screened and you are at an increased risk for chlamydia or gonorrhea. Ask your health care provider if you are at risk.  · If you do not have HIV, but are at risk, it may be recommended that you take a prescription medicine daily to prevent HIV infection. This is called pre-exposure prophylaxis (PrEP). You are considered at risk if:  ? You are sexually active and do not regularly use condoms or know the HIV status of your partner(s).  ? You take drugs by injection.  ? You  are sexually active with a partner who has HIV.    Talk with your health care provider about whether you are at high risk of being infected with HIV. If you choose to begin PrEP, you should first be tested for HIV. You should then be tested every 3 months for as long as you are taking PrEP.  Pregnancy  · If you are premenopausal and you may become pregnant, ask your health care provider about preconception counseling.  · If you may become pregnant, take 400 to 800 micrograms (mcg) of folic acid every day.  · If you want to prevent pregnancy, talk to your health care provider about birth control (contraception).  Osteoporosis and menopause  · Osteoporosis is a disease in which the bones lose minerals and strength with aging. This can result in serious bone fractures. Your risk for osteoporosis can be identified using a bone density scan.  · If you are 65 years of age or older, or if you are at risk for osteoporosis and fractures, ask your health care provider if you should be screened.  · Ask your health care provider whether you should take a calcium or vitamin D supplement to lower your risk for osteoporosis.  · Menopause may have certain physical symptoms and risks.  · Hormone replacement therapy may reduce some of these symptoms and risks.  Talk to your health care provider about whether hormone replacement therapy is right for you.  Follow these instructions at home:  · Schedule regular health, dental, and eye exams.  · Stay current with your immunizations.  · Do not use any tobacco products including cigarettes, chewing tobacco, or electronic cigarettes.  · If you are pregnant, do not drink alcohol.  · If you are breastfeeding, limit how much and how often you drink alcohol.  · Limit alcohol intake to no more than 1 drink per day for nonpregnant women. One drink equals 12 ounces of beer, 5 ounces of wine, or 1½ ounces of hard liquor.  · Do not use street drugs.  · Do not share needles.  · Ask your health care  provider for help if you need support or information about quitting drugs.  · Tell your health care provider if you often feel depressed.  · Tell your health care provider if you have ever been abused or do not feel safe at home.  This information is not intended to replace advice given to you by your health care provider. Make sure you discuss any questions you have with your health care provider.  Document Released: 07/02/2012 Document Revised: 05/25/2017 Document Reviewed: 09/20/2016  [employer] Interactive Patient Education © 2019 [employer]

## 2019-07-05 NOTE — PROGRESS NOTES
"Gaurav Dias is a 58 y.o. female here for   Chief Complaint   Patient presents with   • Hyperlipidemia   • Annual Exam   .    Vitals:    07/05/19 1320   BP: 124/74   BP Location: Left arm   Patient Position: Sitting   Cuff Size: Adult   Pulse: 77   SpO2: 98%   Weight: 70.2 kg (154 lb 12.8 oz)   Height: 167.6 cm (66\")       Body mass index is 24.99 kg/m².    Hyperlipidemia   This is a chronic problem. The current episode started more than 1 year ago. The problem is controlled. Recent lipid tests were reviewed and are normal. She has no history of diabetes. Pertinent negatives include no chest pain, myalgias or shortness of breath.        The following portions of the patient's history were reviewed and updated as appropriate: allergies, current medications, past social history and problem list.    Review of Systems   Constitutional: Positive for fatigue. Negative for appetite change, chills, fever and unexpected weight change.   HENT: Positive for postnasal drip. Negative for congestion, ear pain, mouth sores, sore throat, tinnitus, trouble swallowing and voice change.    Eyes: Negative for pain and visual disturbance.   Respiratory: Negative for cough, choking, shortness of breath and wheezing.    Cardiovascular: Negative for chest pain, palpitations and leg swelling.   Gastrointestinal: Negative for abdominal pain, blood in stool, constipation, diarrhea, nausea and vomiting.   Endocrine: Negative for cold intolerance, heat intolerance, polydipsia and polyuria.   Genitourinary: Negative for difficulty urinating, dysuria, enuresis, flank pain, frequency, hematuria, urgency and vaginal bleeding.   Musculoskeletal: Positive for back pain. Negative for arthralgias, gait problem, joint swelling, myalgias, neck pain and neck stiffness.   Skin: Negative for color change and rash.   Allergic/Immunologic: Positive for environmental allergies and food allergies (gluten). Negative for immunocompromised state. "   Neurological: Negative for dizziness, tremors, seizures, syncope, speech difficulty, weakness, numbness and headaches.   Hematological: Negative for adenopathy. Does not bruise/bleed easily.   Psychiatric/Behavioral: Negative for agitation, confusion, decreased concentration, dysphoric mood (ok), sleep disturbance and suicidal ideas. The patient is nervous/anxious (work stress).      YHS=[id number] at work 5 mos ago.    Objective   Physical Exam   Constitutional: She appears well-developed and well-nourished. No distress.   HENT:   Right Ear: Hearing, tympanic membrane, external ear and ear canal normal.   Left Ear: Hearing, tympanic membrane, external ear and ear canal normal.   Nose: Right sinus exhibits no maxillary sinus tenderness and no frontal sinus tenderness. Left sinus exhibits no maxillary sinus tenderness and no frontal sinus tenderness.   Eyes: Conjunctivae, EOM and lids are normal. Pupils are equal, round, and reactive to light.   Neck: Trachea normal. Neck supple. No JVD present. Carotid bruit is not present. No tracheal deviation present. No thyroid mass and no thyromegaly present.   Cardiovascular: Normal rate, regular rhythm, S1 normal, S2 normal and normal heart sounds. Exam reveals no gallop and no friction rub.   No murmur heard.  Pulses:       Carotid pulses are 2+ on the right side, and 2+ on the left side.       Radial pulses are 2+ on the right side, and 2+ on the left side.        Dorsalis pedis pulses are 2+ on the right side, and 2+ on the left side.        Posterior tibial pulses are 2+ on the right side, and 2+ on the left side.   Pulmonary/Chest: Effort normal and breath sounds normal. No respiratory distress. She has no wheezes. She has no rales. Chest wall is not dull to percussion. She exhibits no tenderness. Right breast exhibits no skin change and no tenderness. Left breast exhibits no skin change and no tenderness.   Abdominal: Soft. Normal aorta and bowel sounds are normal. She  exhibits no abdominal bruit. There is no hepatosplenomegaly. There is no tenderness. There is no rebound and no guarding. No hernia.   Musculoskeletal: Normal range of motion. She exhibits no edema.   Lymphadenopathy:     She has no cervical adenopathy.     She has no axillary adenopathy.        Right: No supraclavicular adenopathy present.        Left: No supraclavicular adenopathy present.   Neurological: She is alert. She has normal strength. No cranial nerve deficit or sensory deficit. She displays a negative Romberg sign.   Reflex Scores:       Patellar reflexes are 2+ on the right side and 2+ on the left side.  Skin: Skin is warm and dry.   Psychiatric: She has a normal mood and affect. Her behavior is normal.   Nursing note and vitals reviewed.    S/p bilat mastectomy.    Assessment/Plan   Diagnoses and all orders for this visit:    Annual physical exam  -     CBC Auto Differential; Future  -     Comprehensive Metabolic Panel; Future  -     Cancel: Lipid Panel; Future  -     Urinalysis With Microscopic If Indicated (No Culture) - Urine, Clean Catch; Future    Other hyperlipidemia  Comments:  continue diet/ex  Orders:  -     Comprehensive Metabolic Panel; Future  -     Cancel: Lipid Panel; Future  -     Lipoprotein NMR; Future    Anxiety  Comments:  ok with paxil & wellbutrin - call if problems    Monopolar depression (CMS/HCC)  Comments:  controlled    Menopause  -     DEXA Bone Density Axial; Future    Thrombocytosis after splenectomy  Comments:  pneu 23 today  Orders:  -     Pneumococcal Polysaccharide Vaccine 23-Valent (PPSV23) Greater Than or Equal To 3yo Subcutaneous / IM    Chronic fatigue  Comments:  mild -call if worse  Orders:  -     CBC Auto Differential; Future  -     Urinalysis With Microscopic If Indicated (No Culture) - Urine, Clean Catch; Future  -     TSH Rfx On Abnormal To Free T4; Future    Mood disorder (CMS/HCC)  Comments:  restart addition wellbutrin to paxil - call if  problems  Orders:  -     buPROPion XL (WELLBUTRIN XL) 150 MG 24 hr tablet; Take 1 tablet by mouth Daily. In addition to paxil    Chronic midline low back pain without sciatica  Comments:  mild -she declines evaluation and treatment for now-call if worse    Family history of heart disease  Comments:  brother had stent age 68 - continue diet and exercise-labs today    Allergic rhinitis, unspecified seasonality, unspecified trigger  Comments:  ok to use flonase,mucinex, allegra

## 2019-07-25 ENCOUNTER — CLINICAL SUPPORT (OUTPATIENT)
Dept: INTERNAL MEDICINE | Facility: CLINIC | Age: 59
End: 2019-07-25

## 2019-07-25 ENCOUNTER — LAB (OUTPATIENT)
Dept: INTERNAL MEDICINE | Facility: CLINIC | Age: 59
End: 2019-07-25

## 2019-07-25 DIAGNOSIS — R53.82 CHRONIC FATIGUE: ICD-10-CM

## 2019-07-25 DIAGNOSIS — Z78.0 MENOPAUSE: ICD-10-CM

## 2019-07-25 DIAGNOSIS — Z00.00 ANNUAL PHYSICAL EXAM: ICD-10-CM

## 2019-07-25 DIAGNOSIS — E78.49 OTHER HYPERLIPIDEMIA: ICD-10-CM

## 2019-07-25 LAB
ALBUMIN SERPL-MCNC: 4.1 G/DL (ref 3.5–5.2)
ALBUMIN/GLOB SERPL: 1.2 G/DL
ALP SERPL-CCNC: 78 U/L (ref 39–117)
ALT SERPL-CCNC: 14 U/L (ref 1–33)
AST SERPL-CCNC: 20 U/L (ref 1–32)
BASOPHILS # BLD AUTO: 0.11 10*3/MM3 (ref 0–0.2)
BASOPHILS NFR BLD AUTO: 1.4 % (ref 0–1.5)
BILIRUB SERPL-MCNC: 0.4 MG/DL (ref 0.2–1.2)
BILIRUB UR QL STRIP: NEGATIVE
BUN SERPL-MCNC: 17 MG/DL (ref 6–20)
BUN/CREAT SERPL: 23 (ref 7–25)
CALCIUM SERPL-MCNC: 9.5 MG/DL (ref 8.6–10.5)
CHLORIDE SERPL-SCNC: 102 MMOL/L (ref 98–107)
CLARITY UR: CLEAR
CO2 SERPL-SCNC: 26.3 MMOL/L (ref 22–29)
COLOR UR: YELLOW
CREAT SERPL-MCNC: 0.74 MG/DL (ref 0.57–1)
DEPRECATED RDW RBC AUTO: 44.9 FL (ref 37–54)
EOSINOPHIL # BLD AUTO: 0.18 10*3/MM3 (ref 0–0.4)
EOSINOPHIL NFR BLD AUTO: 2.4 % (ref 0.3–6.2)
ERYTHROCYTE [DISTWIDTH] IN BLOOD BY AUTOMATED COUNT: 13.9 % (ref 12.3–15.4)
GLOBULIN SER CALC-MCNC: 3.4 GM/DL
GLUCOSE SERPL-MCNC: 99 MG/DL (ref 65–99)
GLUCOSE UR STRIP-MCNC: NEGATIVE MG/DL
HCT VFR BLD AUTO: 44.3 % (ref 34–46.6)
HGB BLD-MCNC: 14.7 G/DL (ref 12–15.9)
HGB UR QL STRIP.AUTO: NEGATIVE
KETONES UR QL STRIP: NEGATIVE
LEUKOCYTE ESTERASE UR QL STRIP.AUTO: NEGATIVE
LYMPHOCYTES # BLD AUTO: 3.22 10*3/MM3 (ref 0.7–3.1)
LYMPHOCYTES NFR BLD AUTO: 42.2 % (ref 19.6–45.3)
MCH RBC QN AUTO: 30.1 PG (ref 26.6–33)
MCHC RBC AUTO-ENTMCNC: 33.2 G/DL (ref 31.5–35.7)
MCV RBC AUTO: 90.6 FL (ref 79–97)
MONOCYTES # BLD AUTO: 0.75 10*3/MM3 (ref 0.1–0.9)
MONOCYTES NFR BLD AUTO: 9.8 % (ref 5–12)
NEUTROPHILS # BLD AUTO: 3.37 10*3/MM3 (ref 1.7–7)
NEUTROPHILS NFR BLD AUTO: 44.2 % (ref 42.7–76)
NITRITE UR QL STRIP: NEGATIVE
PH UR STRIP.AUTO: 7 [PH] (ref 5–8)
PLATELET # BLD AUTO: 737 10*3/MM3 (ref 140–450)
PMV BLD AUTO: 9.6 FL (ref 6–12)
POTASSIUM SERPL-SCNC: 4.2 MMOL/L (ref 3.5–5.2)
PROT SERPL-MCNC: 7.5 G/DL (ref 6–8.5)
PROT UR QL STRIP: NEGATIVE
RBC # BLD AUTO: 4.89 10*6/MM3 (ref 3.77–5.28)
SODIUM SERPL-SCNC: 140 MMOL/L (ref 136–145)
SP GR UR STRIP: 1.01 (ref 1–1.03)
UROBILINOGEN UR QL STRIP: NORMAL
WBC NRBC COR # BLD: 7.63 10*3/MM3 (ref 3.4–10.8)

## 2019-07-25 PROCEDURE — 81003 URINALYSIS AUTO W/O SCOPE: CPT | Performed by: INTERNAL MEDICINE

## 2019-07-25 PROCEDURE — 77080 DXA BONE DENSITY AXIAL: CPT | Performed by: INTERNAL MEDICINE

## 2019-07-25 PROCEDURE — 85025 COMPLETE CBC W/AUTO DIFF WBC: CPT | Performed by: INTERNAL MEDICINE

## 2019-07-26 LAB
CHOLEST SERPL-MCNC: 237 MG/DL (ref 100–199)
HDL SERPL-SCNC: 34.6 UMOL/L
HDLC SERPL-MCNC: 62 MG/DL
LDL-P: 1732 NMOL/L
LDLC REAL SIZE PAT SERPL: 21.3 NM
LDLC SERPL CALC-MCNC: 164 MG/DL (ref 0–99)
LP-IR SCORE**: <25
SMALL LDL-P: 428 NMOL/L
SPECIMEN STATUS: NORMAL
TRIGL SERPL-MCNC: 53 MG/DL (ref 0–149)

## 2019-07-31 LAB — TSH SERPL-ACNC: 1.75 UIU/ML (ref 0.45–4.5)

## 2019-08-05 ENCOUNTER — TELEPHONE (OUTPATIENT)
Dept: INTERNAL MEDICINE | Facility: CLINIC | Age: 59
End: 2019-08-05

## 2020-03-31 DIAGNOSIS — F39 MOOD DISORDER (HCC): ICD-10-CM

## 2020-03-31 RX ORDER — BUPROPION HYDROCHLORIDE 150 MG/1
TABLET ORAL
Qty: 90 TABLET | Refills: 0 | Status: SHIPPED | OUTPATIENT
Start: 2020-03-31 | End: 2020-06-29

## 2020-06-29 DIAGNOSIS — F39 MOOD DISORDER (HCC): ICD-10-CM

## 2020-06-29 RX ORDER — BUPROPION HYDROCHLORIDE 150 MG/1
TABLET ORAL
Qty: 90 TABLET | Refills: 1 | Status: SHIPPED | OUTPATIENT
Start: 2020-06-29 | End: 2020-12-28

## 2020-07-02 DIAGNOSIS — Z00.00 ANNUAL PHYSICAL EXAM: ICD-10-CM

## 2020-07-02 DIAGNOSIS — R53.82 CHRONIC FATIGUE: ICD-10-CM

## 2020-07-02 DIAGNOSIS — E78.49 OTHER HYPERLIPIDEMIA: Primary | ICD-10-CM

## 2020-07-13 ENCOUNTER — OFFICE VISIT (OUTPATIENT)
Dept: INTERNAL MEDICINE | Facility: CLINIC | Age: 60
End: 2020-07-13

## 2020-07-13 VITALS
SYSTOLIC BLOOD PRESSURE: 138 MMHG | DIASTOLIC BLOOD PRESSURE: 60 MMHG | BODY MASS INDEX: 25.07 KG/M2 | HEART RATE: 62 BPM | RESPIRATION RATE: 16 BRPM | WEIGHT: 156 LBS | OXYGEN SATURATION: 98 % | TEMPERATURE: 98.2 F | HEIGHT: 66 IN

## 2020-07-13 DIAGNOSIS — F39 MOOD DISORDER (HCC): ICD-10-CM

## 2020-07-13 DIAGNOSIS — Z90.13 ABSENCE OF BOTH BREASTS: ICD-10-CM

## 2020-07-13 DIAGNOSIS — R53.82 CHRONIC FATIGUE: ICD-10-CM

## 2020-07-13 DIAGNOSIS — E78.49 OTHER HYPERLIPIDEMIA: ICD-10-CM

## 2020-07-13 DIAGNOSIS — Z00.00 ANNUAL PHYSICAL EXAM: Primary | ICD-10-CM

## 2020-07-13 DIAGNOSIS — Z86.010 HISTORY OF COLON POLYPS: ICD-10-CM

## 2020-07-13 DIAGNOSIS — Z82.49 FAMILY HISTORY OF HEART DISEASE: ICD-10-CM

## 2020-07-13 DIAGNOSIS — Z90.81 THROMBOCYTOSIS AFTER SPLENECTOMY: ICD-10-CM

## 2020-07-13 DIAGNOSIS — J30.9 ALLERGIC RHINITIS, UNSPECIFIED SEASONALITY, UNSPECIFIED TRIGGER: ICD-10-CM

## 2020-07-13 DIAGNOSIS — D75.838 THROMBOCYTOSIS AFTER SPLENECTOMY: ICD-10-CM

## 2020-07-13 LAB
ALBUMIN SERPL-MCNC: 4.5 G/DL (ref 3.5–5.2)
ALBUMIN/GLOB SERPL: 2 G/DL
ALP SERPL-CCNC: 78 U/L (ref 39–117)
ALT SERPL-CCNC: 16 U/L (ref 1–33)
APPEARANCE UR: ABNORMAL
AST SERPL-CCNC: 18 U/L (ref 1–32)
BACTERIA #/AREA URNS HPF: ABNORMAL /HPF
BASOPHILS # BLD AUTO: 0.13 10*3/MM3 (ref 0–0.2)
BASOPHILS NFR BLD AUTO: 1.4 % (ref 0–1.5)
BILIRUB SERPL-MCNC: 0.4 MG/DL (ref 0–1.2)
BILIRUB UR QL STRIP: NEGATIVE
BUN SERPL-MCNC: 20 MG/DL (ref 8–23)
BUN/CREAT SERPL: 23.8 (ref 7–25)
CALCIUM SERPL-MCNC: 9.8 MG/DL (ref 8.6–10.5)
CASTS URNS MICRO: ABNORMAL
CHLORIDE SERPL-SCNC: 99 MMOL/L (ref 98–107)
CHOLEST SERPL-MCNC: 237 MG/DL (ref 0–200)
CO2 SERPL-SCNC: 28.3 MMOL/L (ref 22–29)
COLOR UR: YELLOW
CREAT SERPL-MCNC: 0.84 MG/DL (ref 0.57–1)
EOSINOPHIL # BLD AUTO: 0.22 10*3/MM3 (ref 0–0.4)
EOSINOPHIL NFR BLD AUTO: 2.3 % (ref 0.3–6.2)
EPI CELLS #/AREA URNS HPF: ABNORMAL /HPF
ERYTHROCYTE [DISTWIDTH] IN BLOOD BY AUTOMATED COUNT: 12.7 % (ref 12.3–15.4)
GLOBULIN SER CALC-MCNC: 2.2 GM/DL
GLUCOSE SERPL-MCNC: 92 MG/DL (ref 65–99)
GLUCOSE UR QL: NEGATIVE
HCT VFR BLD AUTO: 43.1 % (ref 34–46.6)
HDLC SERPL-MCNC: 62 MG/DL (ref 40–60)
HGB BLD-MCNC: 14.5 G/DL (ref 12–15.9)
HGB UR QL STRIP: ABNORMAL
IMM GRANULOCYTES # BLD AUTO: 0.01 10*3/MM3 (ref 0–0.05)
IMM GRANULOCYTES NFR BLD AUTO: 0.1 % (ref 0–0.5)
KETONES UR QL STRIP: NEGATIVE
LDLC SERPL CALC-MCNC: 150 MG/DL (ref 0–100)
LEUKOCYTE ESTERASE UR QL STRIP: ABNORMAL
LYMPHOCYTES # BLD AUTO: 4.32 10*3/MM3 (ref 0.7–3.1)
LYMPHOCYTES NFR BLD AUTO: 46 % (ref 19.6–45.3)
MCH RBC QN AUTO: 30.5 PG (ref 26.6–33)
MCHC RBC AUTO-ENTMCNC: 33.6 G/DL (ref 31.5–35.7)
MCV RBC AUTO: 90.5 FL (ref 79–97)
MONOCYTES # BLD AUTO: 0.74 10*3/MM3 (ref 0.1–0.9)
MONOCYTES NFR BLD AUTO: 7.9 % (ref 5–12)
NEUTROPHILS # BLD AUTO: 3.97 10*3/MM3 (ref 1.7–7)
NEUTROPHILS NFR BLD AUTO: 42.3 % (ref 42.7–76)
NITRITE UR QL STRIP: NEGATIVE
NRBC BLD AUTO-RTO: 0 /100 WBC (ref 0–0.2)
PH UR STRIP: 6.5 [PH] (ref 5–8)
PLATELET # BLD AUTO: 629 10*3/MM3 (ref 140–450)
POTASSIUM SERPL-SCNC: 4.8 MMOL/L (ref 3.5–5.2)
PROT SERPL-MCNC: 6.7 G/DL (ref 6–8.5)
PROT UR QL STRIP: NEGATIVE
RBC # BLD AUTO: 4.76 10*6/MM3 (ref 3.77–5.28)
RBC #/AREA URNS HPF: ABNORMAL /HPF
SODIUM SERPL-SCNC: 140 MMOL/L (ref 136–145)
SP GR UR: 1.01 (ref 1–1.03)
TRIGL SERPL-MCNC: 123 MG/DL (ref 0–150)
TSH SERPL DL<=0.005 MIU/L-ACNC: 1.25 UIU/ML (ref 0.27–4.2)
UROBILINOGEN UR STRIP-MCNC: ABNORMAL MG/DL
VLDLC SERPL CALC-MCNC: 24.6 MG/DL
WBC # BLD AUTO: 9.39 10*3/MM3 (ref 3.4–10.8)
WBC #/AREA URNS HPF: ABNORMAL /HPF

## 2020-07-13 PROCEDURE — 99396 PREV VISIT EST AGE 40-64: CPT | Performed by: INTERNAL MEDICINE

## 2020-07-13 NOTE — PROGRESS NOTES
"Subjective   Shavonne Dias is a 59 y.o. female here for   Chief Complaint   Patient presents with   • Annual Exam     Pt presents here today for her annual physical.   .    Vitals:    07/13/20 1103   BP: 138/60   BP Location: Left arm   Patient Position: Sitting   Cuff Size: Adult   Pulse: 62   Resp: 16   Temp: 98.2 °F (36.8 °C)   TempSrc: Oral   SpO2: 98%   Weight: 70.8 kg (156 lb)   Height: 167.6 cm (66\")       Body mass index is 25.18 kg/m².    History of Present Illness     The following portions of the patient's history were reviewed and updated as appropriate: allergies, current medications, past social history and problem list.    Review of Systems   Constitutional: Negative for appetite change, chills, fatigue, fever and unexpected weight change.   HENT: Positive for ear pain (pain outside left ear) and tinnitus. Negative for congestion, mouth sores, sinus pain, sore throat, trouble swallowing and voice change.    Eyes: Negative for pain and visual disturbance.   Respiratory: Negative for cough, choking, shortness of breath and wheezing.    Cardiovascular: Negative for chest pain, palpitations and leg swelling.   Gastrointestinal: Positive for blood in stool (off & on for yrs (hemorrhoids)). Negative for abdominal pain, constipation, diarrhea, nausea and vomiting.   Endocrine: Negative for cold intolerance, heat intolerance, polydipsia and polyuria.   Genitourinary: Negative for difficulty urinating, dysuria, flank pain, frequency, hematuria, urgency and vaginal bleeding.   Musculoskeletal: Negative for arthralgias, back pain, gait problem, joint swelling, myalgias, neck pain and neck stiffness.   Skin: Negative for color change and rash.   Allergic/Immunologic: Positive for environmental allergies. Negative for food allergies and immunocompromised state.   Neurological: Negative for dizziness, tremors, seizures, syncope, speech difficulty, weakness, numbness and headaches.   Hematological: Negative for " adenopathy. Does not bruise/bleed easily.   Psychiatric/Behavioral: Negative for agitation, confusion, decreased concentration, dysphoric mood, sleep disturbance and suicidal ideas. The patient is not nervous/anxious.        Objective   Physical Exam   Constitutional: She appears well-developed and well-nourished.   HENT:   Right Ear: Hearing, tympanic membrane, external ear and ear canal normal.   Left Ear: Hearing, tympanic membrane, external ear and ear canal normal.   Nose: Right sinus exhibits no maxillary sinus tenderness and no frontal sinus tenderness. Left sinus exhibits no maxillary sinus tenderness and no frontal sinus tenderness.   Eyes: Pupils are equal, round, and reactive to light. Conjunctivae, EOM and lids are normal.   Neck: Trachea normal. Neck supple. No JVD present. Carotid bruit is not present. No tracheal deviation present. No thyroid mass and no thyromegaly present.   Cardiovascular: Normal rate, regular rhythm, S1 normal and S2 normal. Exam reveals no gallop and no friction rub.   No murmur heard.  Pulses:       Carotid pulses are 2+ on the right side, and 2+ on the left side.       Radial pulses are 2+ on the right side, and 2+ on the left side.        Dorsalis pedis pulses are 2+ on the right side, and 2+ on the left side.        Posterior tibial pulses are 2+ on the right side, and 2+ on the left side.   Pulmonary/Chest: Effort normal and breath sounds normal. Chest wall is not dull to percussion. Right breast exhibits skin change. Right breast exhibits no tenderness. Left breast exhibits skin change. Left breast exhibits no tenderness.       Abdominal: Soft. Normal aorta and bowel sounds are normal. She exhibits no abdominal bruit. There is no hepatosplenomegaly. There is no tenderness. There is no rebound and no guarding. No hernia.   Musculoskeletal: Normal range of motion. She exhibits no edema.   Lymphadenopathy:     She has no cervical adenopathy.     She has no axillary adenopathy.         Right: No supraclavicular adenopathy present.        Left: No supraclavicular adenopathy present.   Neurological: She is alert. She has normal strength. No cranial nerve deficit or sensory deficit. She displays a negative Romberg sign.   Reflex Scores:       Patellar reflexes are 2+ on the right side and 2+ on the left side.  Skin: Skin is warm and dry.   Nursing note and vitals reviewed.    Well healed bilat mastectomy scars.    Assessment/Plan   Diagnoses and all orders for this visit:    Annual physical exam    Other hyperlipidemia  Comments:  continue diet/ex    History of colon polyps  Comments:  She is due for colonoscopy in 6 weeks-referral made today.  Orders:  -     Ambulatory Referral to Gastroenterology    Mood disorder (CMS/[facility])  Comments:  Controlled anxiety and depression-call if any symptoms.    Family history of heart disease  Comments:  Labs today.    Allergic rhinitis, unspecified seasonality, unspecified trigger  Comments:  Call if worse.    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    Other orders  -     Multiple Vitamins-Minerals (MULTI-VITAMIN GUMMIES PO); Take  by mouth.     She does have an irritation in the folds of the pinna of her left ear-she may apply over-the-counter hydrocortisone for this mild irritation and see dermatology if she continues having problems.      Labs ordered.     Discussed need to stay up to date on screening exams as indicated on sex, age & risk factors. I encouraged healthy weight maintenance with diet & exercise. Need good sleep habits & continue to avoid tobacco & excessive alcohol.

## 2020-09-23 ENCOUNTER — TELEPHONE (OUTPATIENT)
Dept: GASTROENTEROLOGY | Facility: CLINIC | Age: 60
End: 2020-09-23

## 2020-10-22 ENCOUNTER — APPOINTMENT (OUTPATIENT)
Dept: WOMENS IMAGING | Facility: HOSPITAL | Age: 60
End: 2020-10-22

## 2020-10-22 ENCOUNTER — OFFICE VISIT (OUTPATIENT)
Dept: INTERNAL MEDICINE | Facility: CLINIC | Age: 60
End: 2020-10-22

## 2020-10-22 VITALS
SYSTOLIC BLOOD PRESSURE: 122 MMHG | HEIGHT: 67 IN | WEIGHT: 155 LBS | DIASTOLIC BLOOD PRESSURE: 80 MMHG | BODY MASS INDEX: 24.33 KG/M2 | RESPIRATION RATE: 14 BRPM

## 2020-10-22 DIAGNOSIS — G89.29 CHRONIC LEFT-SIDED LOW BACK PAIN WITH LEFT-SIDED SCIATICA: Primary | ICD-10-CM

## 2020-10-22 DIAGNOSIS — M54.42 CHRONIC LEFT-SIDED LOW BACK PAIN WITH LEFT-SIDED SCIATICA: Primary | ICD-10-CM

## 2020-10-22 PROCEDURE — 72110 X-RAY EXAM L-2 SPINE 4/>VWS: CPT | Performed by: RADIOLOGY

## 2020-10-22 PROCEDURE — 99214 OFFICE O/P EST MOD 30 MIN: CPT | Performed by: INTERNAL MEDICINE

## 2020-10-22 PROCEDURE — 72110 X-RAY EXAM L-2 SPINE 4/>VWS: CPT | Performed by: INTERNAL MEDICINE

## 2020-10-22 RX ORDER — CYCLOBENZAPRINE HCL 10 MG
10 TABLET ORAL 3 TIMES DAILY PRN
Qty: 30 TABLET | Refills: 0 | Status: SHIPPED | OUTPATIENT
Start: 2020-10-22 | End: 2022-06-21

## 2020-10-22 NOTE — PROGRESS NOTES
"Subjective   Shavonne Dias is a 60 y.o. female here for   Chief Complaint   Patient presents with   • sciatic pain   • Back Pain   .    Vitals:    10/22/20 1527   BP: 122/80   BP Location: Left arm   Patient Position: Sitting   Cuff Size: Adult   Resp: 14   Weight: 70.3 kg (155 lb)   Height: 168.9 cm (66.5\")       Body mass index is 24.64 kg/m².    Back Pain  This is a new problem. The current episode started 1 to 4 weeks ago. The problem occurs constantly. The problem is unchanged. The pain is present in the lumbar spine. The quality of the pain is described as aching. The pain radiates to the left thigh. The pain is moderate. The symptoms are aggravated by sitting, bending and twisting. Pertinent negatives include no chest pain or fever.        The following portions of the patient's history were reviewed and updated as appropriate: allergies, current medications, past social history and problem list.    Review of Systems   Constitutional: Negative for chills, fatigue and fever.   Respiratory: Negative for cough, shortness of breath and wheezing.    Cardiovascular: Negative for chest pain, palpitations and leg swelling.   Musculoskeletal: Positive for back pain.   Psychiatric/Behavioral: Positive for sleep disturbance.     Low back pain since her horse took a surprise jump 1 month ago.    Objective   Physical Exam  Vitals signs and nursing note reviewed.   Constitutional:       General: She is not in acute distress.     Appearance: She is well-developed.   Cardiovascular:      Rate and Rhythm: Normal rate and regular rhythm.      Heart sounds: Normal heart sounds.   Pulmonary:      Effort: No respiratory distress.      Breath sounds: No wheezing or rales.   Chest:      Chest wall: No tenderness.   Musculoskeletal: Normal range of motion.         General: No swelling, tenderness or deformity.      Right lower leg: No edema.      Left lower leg: No edema.   Skin:     General: Skin is warm and dry.      Findings: " No rash.   Neurological:      General: No focal deficit present.      Cranial Nerves: No cranial nerve deficit.      Sensory: No sensory deficit.      Motor: No weakness.      Coordination: Coordination normal.      Gait: Gait normal.      Deep Tendon Reflexes: Reflexes normal.   Psychiatric:         Behavior: Behavior normal.         Assessment/Plan   Diagnoses and all orders for this visit:    Chronic left-sided low back pain with left-sided sciatica  Comments:  X-ray sent for overread.  Trial of physical therapy.  Will need MRI of the low back if symptoms persist or worsen.  Orders:  -     cyclobenzaprine (FLEXERIL) 10 MG tablet; Take 1 tablet by mouth 3 (Three) Times a Day As Needed for Muscle Spasms.  -     XR Spine Lumbar Complete 4+VW  -     Ambulatory Referral to Physical Therapy Evaluate and treat, Ortho

## 2020-11-02 ENCOUNTER — HOSPITAL ENCOUNTER (OUTPATIENT)
Dept: PHYSICAL THERAPY | Facility: HOSPITAL | Age: 60
Setting detail: THERAPIES SERIES
Discharge: HOME | End: 2020-11-02

## 2020-11-02 DIAGNOSIS — M54.42 CHRONIC LEFT-SIDED LOW BACK PAIN WITH LEFT-SIDED SCIATICA: Primary | ICD-10-CM

## 2020-11-02 DIAGNOSIS — G89.29 CHRONIC LEFT-SIDED LOW BACK PAIN WITH LEFT-SIDED SCIATICA: Primary | ICD-10-CM

## 2020-11-02 PROCEDURE — 97161 PT EVAL LOW COMPLEX 20 MIN: CPT

## 2020-11-02 PROCEDURE — 97110 THERAPEUTIC EXERCISES: CPT

## 2020-11-02 NOTE — THERAPY EVALUATION
Outpatient Physical Therapy Ortho Initial Evaluation  NEENA Gates     Patient Name: Shavonne Dias  : 1960  MRN: [id number]  Today's Date: 2020      Visit Date: 2020    Patient Active Problem List   Diagnosis   • Absence of breast   • Estrogen receptor positive tumor status   • Primary malignant neoplasm of female breast (CMS/HCC)   • Monopolar depression (CMS/HCC)   • Abnormal laboratory test result   • Thrombocytosis after splenectomy (CMS/HCC)   • History of colon polyps   • Hyperlipemia   • Chronic fatigue   • Slow transit constipation   • Gluten intolerance   • Anxiety   • Family history of heart disease   • Chronic midline low back pain without sciatica   • Mood disorder (CMS/HCC)   • Allergic rhinitis        Past Medical History:   Diagnosis Date   • Breast cancer (CMS/HCC) 2006    RT Breast   • Depression    • Gluten intolerance    • History of blood in urine    • History of colon polyps    • History of infectious mononucleosis    • History of palpitations    • History of recurrent UTI (urinary tract infection)    • History of varicose veins    • Thrombocytosis (CMS/HCC)         Past Surgical History:   Procedure Laterality Date   • BREAST BIOPSY Right    • COLONOSCOPY N/A 2012    polyps, [facility]PATH: tubulosvillous adenoma with low dysplasia   • COLONOSCOPY  2015    one 4mm polyp in the transverse colon, [facility]. PATH: Adenomatous polyp with low grade dsyplasia.    • DILATATION AND CURETTAGE N/A 10/26/2010   • ENDOSCOPY  2012    Erythema.  PATh: Patchy mild chronic gastritis   • MASTECTOMY Bilateral 2006   • PANCREATIC CYST EXCISION N/A 2010   • SPLENECTOMY N/A 2010       Visit Dx:     ICD-10-CM ICD-9-CM   1. Chronic left-sided low back pain with left-sided sciatica  M54.42 724.2    G89.29 724.3     338.29         Patient History     Row Name 20 0900             History    Chief Complaint  Joint stiffness;Muscle tenderness;Muscle  "weakness;Pain;Tightness  -LN      Type of Pain  Back pain;Hip pain;Lower Extremity / Leg Right sided  -LN      Date Current Problem(s) Began  -- 2.5 months  -LN      Brief Description of Current Complaint  Patient reports hx of right sided LBP for 2.5 months- she rides horses and horse jumped a flor and she went up in saddle and back down and then after that she was walking her dog and got pulled and fell down and rolled. No other injury reported. \"I feel some popping when I walk up hills in my yard.\"  Patient reports a few nights, she was having an ache into right thigh to knee, but not recently. No c/o any N/T right leg.   -LN      Previous treatment for THIS PROBLEM  Medication  -LN      Patient/Caregiver Goals  Improve strength;Relieve pain;Know what to do to help the symptoms;Return to prior level of function  -LN      Patient/Caregiver Goals Comment  \"Exercises to [facility] lower back area.\"   -LN      Smoking Status  former smoker  -LN      Occupation/sports/leisure activities  works on computer all day; rides horses  -LN      Patient seeing anyone else for problem(s)?  PCP  -LN      How has patient tried to help current problem?  HP; sleeping with 2 pilllows under her knees. Ibuprofen; mm relaxer (hasn't used yet).   -LN      What clinical tests have you had for this problem?  X-ray  -LN      Results of Clinical Tests  Degneration lumbar spine per patient.   -LN      Related/Recent Hospitalizations  No  -LN      History of Previous Related Injuries  recent incident while riding horse and a fall while walking her dog.  -LN         Pain     Pain Location  Back;Hip R  -LN      Pain at Present  5  -LN      Pain at Best  0 with sitting/rest  -LN      Pain at Worst  8  -LN      Pain Frequency  Intermittent  -LN      Pain Description  Aching  -LN      What Performance Factors Make the Current Problem(s) WORSE?  with movement; sitting on couch and getting up from couch; lying down at night  -LN      What " "Performance Factors Make the Current Problem(s) BETTER?  rest/sitting  -LN      Tolerance Time- Standing  \"not too bad\"   -LN      Tolerance Time- Sitting  good until she gets up  -LN      Tolerance Time- Walking  increased lucius with going up and down hills  -LN      Tolerance Time- Lying  has to change positions often  -LN      Is your sleep disturbed?  Yes wakes up more  -LN      What position do you sleep in?  Supine;Right sidelying;Left sidelying  -LN      Difficulties at work?  none reported  -LN      Difficulties with ADL's?  Can do all, but with some discomfort  -LN      Difficulties with recreational activities?  can still ride horses- sometimes increased pain after riding; is now using a step stool to get on and off horse, which is helping.  -LN         Fall Risk Assessment    Any falls in the past year:  Yes  -LN      Number of falls reported in the last 12 months  1  -LN      Factors that contributed to the fall:  Lost balance dog pulled her down with leash  -LN         Services    Prior Rehab/Home Health Experiences  No  -LN      Are you currently receiving Home Health services  No  -LN      Do you plan to receive Home Health services in the near future  No  -LN         Daily Activities    Primary Language  English  -LN      Are you able to read  Yes  -LN      Are you able to write  Yes  -LN      How does patient learn best?  Listening;Demonstration  -LN      Teaching needs identified  Home Exercise Program;Other (comment) Risks & benefits of treatment explained to patient.  -LN      Patient is concerned about/has problems with  Bed Mobility;Climbing Stairs;Difficulty with self care (i.e. bathing, dressing, toileting:;Flexibility;Grasping objects lifting;Performing home management (household chores, shopping, care of dependents);Performing sports, recreation, and play activities;Standing;Walking;Transfers (getting out of a chair, bed)  -LN      Does patient have problems with the following?  None  -LN   "    Barriers to learning  None  -LN      Pt Participated in POC and Goals  Yes  -LN         Safety    Are you being hurt, hit, or frightened by anyone at home or in your life?  No  -LN      Are you being neglected by a caregiver  No  -LN      Have you had any of the following issues with  N/A  -LN        User Key  (r) = Recorded By, (t) = Taken By, (c) = Cosigned By    Initials Name Provider Type    LN Jayne Ortiz, PT Physical Therapist          PT Ortho     Row Name 11/02/20 0900       Subjective Comments    Subjective Comments  Patient c/o aching in right LS area.   -LN       Precautions and Contraindications    Precautions/Limitations  no known precautions/limitations  -LN       Subjective Pain    Able to rate subjective pain?  yes  -LN    Pre-Treatment Pain Level  5  -LN       Posture/Observations    Forward Head  Mild  -LN    Rounded Shoulders  Bilateral:;Mild;Moderate  -LN    Lumbar lordosis  Mild;Decreased;Standing posture  -LN    Iliac crests  Left:;Elevated;Standing posture  -LN       Lumbar/SI Special Tests    Standing Flexion Test (SI Dysfunction)  Right:;Positive  -LN    SLR (Neural Tension)  Bilateral:;Negative  -LN    SI Compression Test (SI Dysfunction)  Bilateral:;Negative  -LN    SI Distraction Test (SI Dysfunction)  Bilateral:;Negative  -LN    SHINE (hip vs. SI Dysfunction)  Bilateral:;Negative  -LN    FAIR Test (Piriformis Syndrome)  Bilateral:;Negative  -LN       Lumbosacral Palpation    SI  Right:;Tender  -LN    Piriformis  Right:;Tender  -LN    Erector Spinae (Paraspinals)  Right:;Tender  -LN    Lumbosacral Palpation Comments  Minimal tenderness palpated.   -LN       Leg Length Test    True  Equal  -LN    Apparent  Unequal;Right Higher Leg sx of right pelvic obliquity  -LN       General ROM    GENERAL ROM COMMENTS  Bilateral LE AROM WFL.   -LN       Head/Neck/Trunk    Trunk Extension AROM  WFL- discomfort  -LN    Trunk Flexion AROM  WFL- discomfort  -LN    Trunk Lt Lateral Flexion  AROM  WFL  -LN    Trunk Rt Lateral Flexion AROM  WFL  -LN    Trunk Lt Rotation AROM  WNL   -LN    Trunk Rt Rotation AROM  WNL  -LN       MMT Neck/Trunk    Trunk Flexion MMT, Gross Movement  (4-/5) good minus  -LN    Trunk Extension MMT, Gross Movement  (4-/5) good minus  -LN    Neck/Trunk Comments   Grossly  -LN       MMT Right Lower Ext    Rt Hip Flexion MMT, Gross Movement  (5/5) normal  -LN    Rt Hip Extension MMT, Gross Movement  (5/5) normal  -LN    Rt Hip ABduction MMT, Gross Movement  (5/5) normal  -LN    Rt Hip ADduction MMT, Gross Movement  (5/5) normal  -LN    Rt Knee Extension MMT, Gross Movement  (5/5) normal  -LN    Rt Knee Flexion MMT, Gross Movement  (5/5) normal  -LN    Rt Ankle Plantarflexion MMT, Gross Movement  (5/5) normal  -LN    Rt Ankle Dorsiflexion MMT, Gross Movement  (5/5) normal  -LN       MMT Left Lower Ext    Lt Hip Flexion MMT, Gross Movement  (5/5) normal  -LN    Lt Hip Extension MMT, Gross Movement  (5/5) normal  -LN    Lt Hip ABduction MMT, Gross Movement  (5/5) normal  -LN    Lt Hip ADduction MMT, Gross Movement  (5/5) normal  -LN    Lt Knee Extension MMT, Gross Movement  (5/5) normal  -LN    Lt Knee Flexion MMT, Gross Movement  (5/5) normal  -LN    Lt Ankle Plantarflexion MMT, Gross Movement  (5/5) normal  -LN    Lt Ankle Dorsiflexion MMT, Gross Movement  (5/5) normal  -LN       Sensation    Sensation WNL?  WNL  -LN    Light Touch  No apparent deficits  -LN       Lower Extremity Flexibility    Hamstrings  Bilateral:;Mildly limited  -LN    Hip Flexors  Bilateral:;Mildly limited  -LN    ITB  Bilateral:;Mildly limited  -LN       Transfers    Sit-Stand Maury (Transfers)  independent  -LN    Stand-Sit Maury (Transfers)  independent  -LN    Transfers, Sit-Stand-Sit, Assist Device  other (see comments) none  -LN       Gait/Stairs (Locomotion)    Maury Level (Gait)  independent  -LN    Assistive Device (Gait)  other (see comments) none  -LN    Comment (Gait/Stairs)   "Patient independent with all functional mobility and gait. No antalgic gait noted today, but minimal guarding noted with transitional movements lucius supine to sit off treatment table.   -LN      User Key  (r) = Recorded By, (t) = Taken By, (c) = Cosigned By    Initials Name Provider Type    Jayne Thomson, PT Physical Therapist                      Therapy Education  Education Details: Patient to work on HEP 1-2 x day as tolerated and use MH/CP PRN. Education on proper body mechanics with lifting; lucius when lifting saddle- patient to try putting saddle on while on step stool so she doesn't have to lift it as high.  Given: HEP, Symptoms/condition management, Pain management, Posture/body mechanics  Program: New  How Provided: Verbal, Demonstration, Written  Provided to: Patient  Level of Understanding: Teach back education performed, Verbalized, Demonstrated     PT OP Goals     Row Name 11/02/20 0900          PT Short Term Goals    STG Date to Achieve  11/16/20 1-2 weeks  -LN     STG 1  Patient to verbally report decreased pain in right LB/LS area to <6/10 \"at it's worst.\"   -LN     STG 2  Patient able to perform 10-15 reps stretching/ROM/core strengthening exercises with no c/o any increased LBP.   -LN     STG 3  Good pelvic alignment maintained between PT sessions with use of MET and core stabilization exercises.   -LN        Long Term Goals    LTG Date to Achieve  11/30/20 3-4 weeks  -LN     LTG 1  Patient to verbally report decreased pain in right LB/LS area to 2/10 \"at it's worst.\"   -LN     LTG 2  Patient independent with HEP issued by therapist.  -LN     LTG 3  Trunk ROM WFL-WNL & painfree all planes.   -LN     LTG 4  Core strength improved to 4-4+/5.   -LN     LTG 5  Patient able to put saddle on horse, get on horse and ride horse with no c/o any increased LBP.   -LN        Time Calculation    PT Goal Re-Cert Due Date  11/30/20  -LN       User Key  (r) = Recorded By, (t) = Taken By, (c) = Cosigned " By    Initials Name Provider Type    Jayne Thomson, PT Physical Therapist          PT Assessment/Plan     Row Name 11/02/20 0900          PT Assessment    Functional Limitations  Limitation in home management;Limitations in community activities;Limitations in functional capacity and performance;Performance in leisure activities;Performance in self-care ADL  -LN     Impairments  Impaired flexibility;Muscle strength;Pain;Posture;Range of motion  -LN     Assessment Comments  Patient presents with 2.5 month history of right sided LB/LS pain with occas. radiating pain into right thigh but no c/o any N/T. Patient with good trunk ROM but with discomfort with trunk flexion & extension; decreased core strength, sx of right pelvic obliquity; decreased flexibility in LE; disturbed sleep. Patient with signs of lumbar DDD. She will benefit from a good stretching/ROM/core stabilization/strengthening program with HEP as well as education on posture and proper lifting techniques/body mechanics.   -LN     Please refer to paper survey for additional self-reported information  Yes  -LN     Rehab Potential  Good  -LN     Patient/caregiver participated in establishment of treatment plan and goals  Yes  -LN     Patient would benefit from skilled therapy intervention  Yes  -LN        PT Plan    PT Frequency  1x/week  -LN     Predicted Duration of Therapy Intervention (PT)  3- 4 weeks  -LN     Planned CPT's?  PT EVAL LOW COMPLEXITY: 77655;PT THER PROC EA 15 MIN: 27938;PT MANUAL THERAPY EA 15 MIN: 89590;PT HOT OR COLD PACK TREAT MCARE;PT ELECTRICAL STIM UNATTEND:   -LN     Physical Therapy Interventions (Optional Details)  lumbar stabilization;home exercise program;manual therapy techniques;modalities;patient/family education;postural re-education;ROM (Range of Motion);strengthening;stretching  -LN     PT Plan Comments  See patient 1 x week for 3-4 weeks for therapeutic exercises/core stabilization & strengthening with  HEP; MET PRN, Patient education/posture education; modalities PRN (MH/CP/IFC).   -LN       User Key  (r) = Recorded By, (t) = Taken By, (c) = Cosigned By    Initials Name Provider Type    Jayne Thomson, PT Physical Therapist          Modalities     Row Name 11/02/20 0900             Ice    Patient denies application of Ice  Yes  -LN      Patient reports will apply ice at home to involved area  Yes  -LN        User Key  (r) = Recorded By, (t) = Taken By, (c) = Cosigned By    Initials Name Provider Type    Jayne Thomson, PT Physical Therapist        OP Exercises     Row Name 11/02/20 0900             Precautions    Existing Precautions/Restrictions  no known precautions/restrictions  -LN         Subjective Comments    Subjective Comments  Patient c/o aching in right LS area.   -LN         Subjective Pain    Able to rate subjective pain?  yes  -LN      Pre-Treatment Pain Level  5  -LN         Exercise 1    Exercise Name 1  supine HS stretch with sheet  -LN      Cueing 1  Verbal;Tactile;Demo  -LN      Reps 1  5  -LN      Time 1  10 sec each  -LN         Exercise 2    Exercise Name 2  supine HS stretch with sheet with adduction (ITB)   -LN      Cueing 2  Verbal;Tactile;Demo  -LN      Reps 2  5  -LN      Time 2  10 sec each  -LN         Exercise 3    Exercise Name 3  SKC  -LN      Cueing 3  Verbal;Tactile;Demo  -LN      Reps 3  5  -LN      Time 3  10 sec each  -LN         Exercise 4    Exercise Name 4  Modified piriformis stretch  -LN      Cueing 4  Verbal;Tactile;Demo  -LN      Reps 4  3  -LN      Time 4  20 sec  -LN         Exercise 5    Exercise Name 5  PPT  -LN      Cueing 5  Verbal;Tactile;Demo  -LN      Reps 5  10  -LN      Time 5  5 sec  -LN         Exercise 6    Exercise Name 6  Hooklying isometric hip adduction vs. Ball  -LN      Cueing 6  Verbal;Tactile;Demo  -LN      Reps 6  10  -LN      Time 6  5 sec  -LN         Exercise 7    Exercise Name 7  Hooklying supine clam shells vs. TB  -LN       Cueing 7  Verbal;Tactile;Demo  -LN      Reps 7  10  -LN      Time 7  5 sec  -LN      Additional Comments  blue TB  -LN        User Key  (r) = Recorded By, (t) = Taken By, (c) = Cosigned By    Initials Name Provider Type    Jayne Thomson, PT Physical Therapist           Manual Rx (last 36 hours)      Manual Treatments     Row Name 11/02/20 0900             Manual Rx 1    Manual Rx 1 Location  pelvis  -LN      Manual Rx 1 Type  MET- shotgun/right anterior innominate/right pelvic outflare  -LN      Manual Rx 1 Duration  Good pelvic alignment noted after MET.   -LN        User Key  (r) = Recorded By, (t) = Taken By, (c) = Cosigned By    Initials Name Provider Type    Jayne Thomson, PT Physical Therapist                      Outcome Measure Options: Other Outcome Measure  Other Outcome Measure Tool Used  Other Outcome Measure Tool Comments: Back index- score of 18 today.      Time Calculation:     Start Time: 0910  Stop Time: 0955  Time Calculation (min): 45 min     Therapy Charges for Today     Code Description Service Date Service Provider Modifiers Qty    69234973667 HC PT THER PROC EA 15 MIN 11/2/2020 Jayne Ortiz, PT GP 1    49957711967 HC PT EVAL LOW COMPLEXITY 2 11/2/2020 Jayne Ortiz, PT GP 1          PT G-Codes  Outcome Measure Options: Other Outcome Measure         Jayne Ortiz, PT  11/2/2020

## 2020-11-10 ENCOUNTER — DOCUMENTATION (OUTPATIENT)
Dept: PHYSICAL THERAPY | Facility: HOSPITAL | Age: 60
End: 2020-11-10

## 2020-11-10 ENCOUNTER — APPOINTMENT (OUTPATIENT)
Dept: PHYSICAL THERAPY | Facility: HOSPITAL | Age: 60
End: 2020-11-10

## 2020-11-18 ENCOUNTER — EDUCATION (OUTPATIENT)
Dept: PHYSICAL THERAPY | Facility: HOSPITAL | Age: 60
End: 2020-11-18

## 2020-11-18 ENCOUNTER — DOCUMENTATION (OUTPATIENT)
Dept: PHYSICAL THERAPY | Facility: HOSPITAL | Age: 60
End: 2020-11-18

## 2020-11-18 ENCOUNTER — APPOINTMENT (OUTPATIENT)
Dept: PHYSICAL THERAPY | Facility: HOSPITAL | Age: 60
End: 2020-11-18

## 2020-12-26 DIAGNOSIS — F39 MOOD DISORDER (HCC): ICD-10-CM

## 2020-12-28 RX ORDER — BUPROPION HYDROCHLORIDE 150 MG/1
TABLET ORAL
Qty: 90 TABLET | Refills: 3 | Status: SHIPPED | OUTPATIENT
Start: 2020-12-28 | End: 2021-12-02

## 2021-01-22 ENCOUNTER — OFFICE VISIT (OUTPATIENT)
Dept: INTERNAL MEDICINE | Facility: CLINIC | Age: 61
End: 2021-01-22

## 2021-01-22 VITALS
HEART RATE: 63 BPM | RESPIRATION RATE: 14 BRPM | TEMPERATURE: 97.3 F | BODY MASS INDEX: 24.77 KG/M2 | DIASTOLIC BLOOD PRESSURE: 80 MMHG | SYSTOLIC BLOOD PRESSURE: 124 MMHG | OXYGEN SATURATION: 100 % | HEIGHT: 67 IN | WEIGHT: 157.8 LBS

## 2021-01-22 DIAGNOSIS — E78.49 OTHER HYPERLIPIDEMIA: ICD-10-CM

## 2021-01-22 DIAGNOSIS — G89.29 CHRONIC RIGHT-SIDED LOW BACK PAIN WITH RIGHT-SIDED SCIATICA: Primary | ICD-10-CM

## 2021-01-22 DIAGNOSIS — R53.82 CHRONIC FATIGUE: ICD-10-CM

## 2021-01-22 DIAGNOSIS — M54.41 CHRONIC RIGHT-SIDED LOW BACK PAIN WITH RIGHT-SIDED SCIATICA: Primary | ICD-10-CM

## 2021-01-22 PROCEDURE — 99213 OFFICE O/P EST LOW 20 MIN: CPT | Performed by: INTERNAL MEDICINE

## 2021-01-22 NOTE — PROGRESS NOTES
"Subjective   Shavonne Dias is a 60 y.o. female seen today for Back Pain (6 month follow up ) and Hyperlipidemia.     Back Pain  This is a recurrent problem. The current episode started more than 1 month ago. The problem occurs intermittently. The problem has been gradually improving since onset. The pain is present in the lumbar spine. Pertinent negatives include no chest pain or fever.   Hyperlipidemia  This is a chronic problem. The current episode started more than 1 year ago. The problem is controlled. Recent lipid tests were reviewed and are normal. She has no history of diabetes. Pertinent negatives include no chest pain or shortness of breath.        The following portions of the patient's history were reviewed and updated as appropriate: allergies, current medications, past social history and problem list.    Review of Systems   Constitutional: Negative for chills, fatigue and fever.   Respiratory: Negative for cough, shortness of breath and wheezing.    Cardiovascular: Negative for chest pain, palpitations and leg swelling.   Musculoskeletal: Positive for back pain (LBP off & on - better now).   Psychiatric/Behavioral: Negative for dysphoric mood and sleep disturbance. The patient is not nervous/anxious.        Objective   /80   Pulse 63   Temp 97.3 °F (36.3 °C) (Skin)   Resp 14   Ht 168.9 cm (66.5\")   Wt 71.6 kg (157 lb 12.8 oz)   LMP  (LMP Unknown)   SpO2 100%   BMI 25.09 kg/m²   Physical Exam  Vitals signs and nursing note reviewed.   Constitutional:       General: She is not in acute distress.     Appearance: She is well-developed.   Cardiovascular:      Rate and Rhythm: Normal rate and regular rhythm.      Heart sounds: Normal heart sounds.   Pulmonary:      Effort: No respiratory distress.      Breath sounds: No wheezing or rales.   Chest:      Chest wall: No tenderness.   Psychiatric:         Behavior: Behavior normal.     gait normal with normal ROM of low back    Assessment/Plan "   Problems Addressed this Visit        Unprioritized    Hyperlipemia     Lipid abnormalities are unchanged.  Nutritional counseling was provided.  Lipids will be reassessed in 3 months.         Relevant Orders    Comprehensive Metabolic Panel    Lipid Panel    Chronic fatigue    Relevant Orders    CBC & Differential    Chronic right-sided low back pain with right-sided sciatica - Primary      Diagnoses       Codes Comments    Chronic right-sided low back pain with right-sided sciatica    -  Primary ICD-10-CM: M54.41, G89.29  ICD-9-CM: 724.2, 724.3, 338.29     Other hyperlipidemia     ICD-10-CM: E78.49  ICD-9-CM: 272.4     Chronic fatigue     ICD-10-CM: R53.82  ICD-9-CM: 780.79          LBP much better now.  Need daily lumbar strengthening exercises (shown at PT) - call if back pain worsens again.

## 2021-01-22 NOTE — PATIENT INSTRUCTIONS
Mediterranean Diet  A Mediterranean diet refers to food and lifestyle choices that are based on the traditions of countries located on the [place]. This way of eating has been shown to help prevent certain conditions and improve outcomes for people who have chronic diseases, like kidney disease and heart disease.  What are tips for following this plan?  Lifestyle  · Cook and eat meals together with your family, when possible.  · Drink enough fluid to keep your urine clear or pale yellow.  · Be physically active every day. This includes:  ? Aerobic exercise like running or swimming.  ? Leisure activities like gardening, walking, or housework.  · Get 7-8 hours of sleep each night.  · If recommended by your health care provider, drink red wine in moderation. This means 1 glass a day for nonpregnant women and 2 glasses a day for men. A glass of wine equals 5 oz (150 mL).  Reading food labels    · Check the serving size of packaged foods. For foods such as rice and pasta, the serving size refers to the amount of cooked product, not dry.  · Check the total fat in packaged foods. Avoid foods that have saturated fat or trans fats.  · Check the ingredients list for added sugars, such as corn syrup.  Shopping  · At the grocery store, buy most of your food from the areas near the walls of the store. This includes:  ? Fresh fruits and vegetables (produce).  ? Grains, beans, nuts, and seeds. Some of these may be available in unpackaged forms or large amounts (in bulk).  ? Fresh seafood.  ? Poultry and eggs.  ? Low-fat dairy products.  · Buy whole ingredients instead of prepackaged foods.  · Buy fresh fruits and vegetables in-season from local farmers markets.  · Buy frozen fruits and vegetables in resealable bags.  · If you do not have access to quality fresh seafood, buy precooked frozen shrimp or canned fish, such as tuna, salmon, or sardines.  · Buy small amounts of raw or cooked vegetables, salads, or olives from  the deli or salad bar at your store.  · Stock your pantry so you always have certain foods on hand, such as olive oil, canned tuna, canned tomatoes, rice, pasta, and beans.  Cooking  · Cook foods with extra-virgin olive oil instead of using butter or other vegetable oils.  · Have meat as a side dish, and have vegetables or grains as your main dish. This means having meat in small portions or adding small amounts of meat to foods like pasta or stew.  · Use beans or vegetables instead of meat in common dishes like chili or lasagna.  · [place] with different cooking methods. Try roasting or broiling vegetables instead of steaming or sautéeing them.  · Add frozen vegetables to soups, stews, pasta, or rice.  · Add nuts or seeds for added healthy fat at each meal. You can add these to yogurt, salads, or vegetable dishes.  · Marinate fish or vegetables using olive oil, lemon juice, garlic, and fresh herbs.  Meal planning    · Plan to eat 1 vegetarian meal one day each week. Try to work up to 2 vegetarian meals, if possible.  · Eat seafood 2 or more times a week.  · Have healthy snacks readily available, such as:  ? Vegetable sticks with hummus.  ? Greek yogurt.  ? Fruit and nut trail mix.  · Eat balanced meals throughout the week. This includes:  ? Fruit: 2-3 servings a day  ? Vegetables: 4-5 servings a day  ? Low-fat dairy: 2 servings a day  ? Fish, poultry, or lean meat: 1 serving a day  ? Beans and legumes: 2 or more servings a week  ? Nuts and seeds: 1-2 servings a day  ? Whole grains: 6-8 servings a day  ? Extra-virgin olive oil: 3-4 servings a day  · Limit red meat and sweets to only a few servings a month  What are my food choices?  · Mediterranean diet  ? Recommended  § Grains: Whole-grain pasta. Brown rice. Bulgar wheat. Polenta. Couscous. Whole-wheat bread. Oatmeal. Quinoa.  § Vegetables: Artichokes. Beets. Broccoli. Cabbage. Carrots. Eggplant. Green beans. Chard. Kale. Spinach. Onions. Leeks. Peas. Squash.  Tomatoes. Peppers. Radishes.  § Fruits: Apples. Apricots. Avocado. Berries. Bananas. Cherries. Dates. Figs. Grapes. Riky. Melon. Oranges. Peaches. Plums. Pomegranate.  § Meats and other protein foods: Beans. Almonds. Sunflower seeds. Pine nuts. Peanuts. Cod. [place]. Scallops. Shrimp. Tuna. Tilapia. Clams. Oysters. Eggs.  § Dairy: Low-fat milk. Cheese. Greek yogurt.  § Beverages: Water. Red wine. Herbal tea.  § Fats and oils: Extra virgin olive oil. Avocado oil. Grape seed oil.  § Sweets and desserts: Greek yogurt with honey. Baked apples. Poached pears. Trail mix.  § Seasoning and other foods: Basil. Cilantro. Coriander. Cumin. Mint. Parsley. [place]. Rosemary. Tarragon. Garlic. Oregano. Thyme. Pepper. Balsalmic vinegar. Tahini. Hummus. Tomato sauce. Olives. Mushrooms.  ? Limit these  § Grains: Prepackaged pasta or rice dishes. Prepackaged cereal with added sugar.  § Vegetables: Deep fried potatoes (french fries).  § Fruits: Fruit canned in syrup.  § Meats and other protein foods: Beef. Pork. Lamb. Poultry with skin. Hot dogs. Ariza.  § Dairy: Ice cream. Sour cream. Whole milk.  § Beverages: Juice. Sugar-sweetened soft drinks. Beer. Liquor and spirits.  § Fats and oils: Butter. Canola oil. Vegetable oil. Beef fat (tallow). Lard.  § Sweets and desserts: Cookies. Cakes. Pies. Candy.  § Seasoning and other foods: Mayonnaise. Premade sauces and marinades.  The items listed may not be a complete list. Talk with your dietitian about what dietary choices are right for you.  Summary  · The Mediterranean diet includes both food and lifestyle choices.  · Eat a variety of fresh fruits and vegetables, beans, nuts, seeds, and whole grains.  · Limit the amount of red meat and sweets that you eat.  · Talk with your health care provider about whether it is safe for you to drink red wine in moderation. This means 1 glass a day for nonpregnant women and 2 glasses a day for men. A glass of wine equals 5 oz (150 mL).  This information  is not intended to replace advice given to you by your health care provider. Make sure you discuss any questions you have with your health care provider.  Document Revised: 08/17/2017 Document Reviewed: 08/10/2017  Elsevier Patient Education © 2020 [employer] Inc.

## 2021-01-27 ENCOUNTER — TELEPHONE (OUTPATIENT)
Dept: GASTROENTEROLOGY | Facility: CLINIC | Age: 61
End: 2021-01-27

## 2021-01-27 NOTE — TELEPHONE ENCOUNTER
Last scope 09/11/2015-- personal hx of polyps-- mother and father hx of polyps --ASA-- medications:     buPROPion XL (WELLBUTRIN XL) 150 MG 24 hr tablet     Cholecalciferol (VITAMIN D) 1000 units tablet     cyclobenzaprine (FLEXERIL) 10 MG tablet     fluticasone (FLONASE) 50 MCG/ACT nasal spray     guaifenesin-dextromethorphan (MUCINEX DM)  MG tablet sustained-release 12 hour tablet     Multiple Vitamins-Minerals (MULTI-VITAMIN GUMMIES PO)     mupirocin (BACTROBAN) 2 % ointment     PARoxetine (PAXIL) 10 MG tablet     triamcinolone (KENALOG) 0.1 % ointment    ASA     OA form and last scope scanned into media

## 2021-01-27 NOTE — TELEPHONE ENCOUNTER
Received VM from patient.  Called and LM for her to contact office to complete questionnaire and update insurance information.

## 2021-01-31 ENCOUNTER — PREP FOR SURGERY (OUTPATIENT)
Dept: OTHER | Facility: HOSPITAL | Age: 61
End: 2021-01-31

## 2021-01-31 DIAGNOSIS — K63.5 COLON POLYP: Primary | ICD-10-CM

## 2021-01-31 DIAGNOSIS — Z83.71 FAMILY HISTORY OF COLONIC POLYPS: ICD-10-CM

## 2021-02-09 PROBLEM — Z83.71 FAMILY HISTORY OF COLONIC POLYPS: Status: ACTIVE | Noted: 2021-02-09

## 2021-02-09 PROBLEM — K63.5 COLON POLYP: Status: ACTIVE | Noted: 2021-02-09

## 2021-02-09 PROBLEM — Z83.719 FAMILY HISTORY OF COLONIC POLYPS: Status: ACTIVE | Noted: 2021-02-09

## 2021-03-24 ENCOUNTER — TRANSCRIBE ORDERS (OUTPATIENT)
Dept: SLEEP MEDICINE | Facility: HOSPITAL | Age: 61
End: 2021-03-24

## 2021-03-24 DIAGNOSIS — Z01.818 OTHER SPECIFIED PRE-OPERATIVE EXAMINATION: Primary | ICD-10-CM

## 2021-04-02 ENCOUNTER — TELEPHONE (OUTPATIENT)
Dept: INTERNAL MEDICINE | Facility: CLINIC | Age: 61
End: 2021-04-02

## 2021-04-02 NOTE — TELEPHONE ENCOUNTER
Caller: Shavonne Dias    Relationship: Self    Best call back number: [phone] (H)    What does billing need from the patient: PATIENT CALLING IN REGARDS TO HAVING A COLONOSCOPY SCHEDULED BY DR RUVALCABA PATIENT STATES INSURANCE IS NOT PAYING FOR TEST DUE TO CPT CODE 62904 PATIENT INQUIRING IF THERE IS ANOTHER CPT CODE THAT NEEDS TO BE USED    COLONOSCOPY IS SCHEDULED FOR April 6TH    PLEASE ADVISE

## 2021-04-03 ENCOUNTER — LAB (OUTPATIENT)
Dept: LAB | Facility: HOSPITAL | Age: 61
End: 2021-04-03

## 2021-04-03 DIAGNOSIS — Z01.818 OTHER SPECIFIED PRE-OPERATIVE EXAMINATION: ICD-10-CM

## 2021-04-03 PROCEDURE — U0004 COV-19 TEST NON-CDC HGH THRU: HCPCS

## 2021-04-03 PROCEDURE — C9803 HOPD COVID-19 SPEC COLLECT: HCPCS

## 2021-04-05 ENCOUNTER — TELEPHONE (OUTPATIENT)
Dept: GASTROENTEROLOGY | Facility: CLINIC | Age: 61
End: 2021-04-05

## 2021-04-05 LAB — SARS-COV-2 RNA RESP QL NAA+PROBE: NOT DETECTED

## 2021-04-05 NOTE — TELEPHONE ENCOUNTER
Pt called w/insurance online asking for cpt/dx codes and if auth is required. Provided info and resubmitted through [facility]. The selected member does not require an Order ID from [facility] for surgical servicesThe selected member does not require auth.

## 2021-04-06 ENCOUNTER — ANESTHESIA EVENT (OUTPATIENT)
Dept: GASTROENTEROLOGY | Facility: HOSPITAL | Age: 61
End: 2021-04-06

## 2021-04-06 ENCOUNTER — ANESTHESIA (OUTPATIENT)
Dept: GASTROENTEROLOGY | Facility: HOSPITAL | Age: 61
End: 2021-04-06

## 2021-04-06 ENCOUNTER — HOSPITAL ENCOUNTER (OUTPATIENT)
Facility: HOSPITAL | Age: 61
Setting detail: HOSPITAL OUTPATIENT SURGERY
Discharge: HOME | End: 2021-04-06
Attending: INTERNAL MEDICINE | Admitting: INTERNAL MEDICINE

## 2021-04-06 VITALS
OXYGEN SATURATION: 100 % | WEIGHT: 155 LBS | DIASTOLIC BLOOD PRESSURE: 80 MMHG | HEIGHT: 66 IN | RESPIRATION RATE: 16 BRPM | BODY MASS INDEX: 24.91 KG/M2 | HEART RATE: 61 BPM | SYSTOLIC BLOOD PRESSURE: 119 MMHG

## 2021-04-06 LAB
ALBUMIN SERPL-MCNC: 3.7 G/DL (ref 3.5–5.2)
ALBUMIN/GLOB SERPL: 1.8 G/DL
ALP SERPL-CCNC: 71 U/L (ref 39–117)
ALT SERPL W P-5'-P-CCNC: 13 U/L (ref 1–33)
ANION GAP SERPL CALCULATED.3IONS-SCNC: 6.4 MMOL/L (ref 5–15)
AST SERPL-CCNC: 15 U/L (ref 1–32)
BASOPHILS # BLD AUTO: 0.12 10*3/MM3 (ref 0–0.2)
BASOPHILS NFR BLD AUTO: 1.6 % (ref 0–1.5)
BILIRUB SERPL-MCNC: 0.5 MG/DL (ref 0–1.2)
BUN SERPL-MCNC: 11 MG/DL (ref 8–23)
BUN/CREAT SERPL: 17.2 (ref 7–25)
CALCIUM SPEC-SCNC: 8.6 MG/DL (ref 8.6–10.5)
CHLORIDE SERPL-SCNC: 107 MMOL/L (ref 98–107)
CO2 SERPL-SCNC: 28.6 MMOL/L (ref 22–29)
CREAT SERPL-MCNC: 0.64 MG/DL (ref 0.57–1)
DEPRECATED RDW RBC AUTO: 41.6 FL (ref 37–54)
EOSINOPHIL # BLD AUTO: 0.19 10*3/MM3 (ref 0–0.4)
EOSINOPHIL NFR BLD AUTO: 2.5 % (ref 0.3–6.2)
ERYTHROCYTE [DISTWIDTH] IN BLOOD BY AUTOMATED COUNT: 13.1 % (ref 12.3–15.4)
GFR SERPL CREATININE-BSD FRML MDRD: 94 ML/MIN/1.73
GLOBULIN UR ELPH-MCNC: 2.1 GM/DL
GLUCOSE SERPL-MCNC: 96 MG/DL (ref 65–99)
HCT VFR BLD AUTO: 39.5 % (ref 34–46.6)
HGB BLD-MCNC: 13.4 G/DL (ref 12–15.9)
IMM GRANULOCYTES # BLD AUTO: 0.02 10*3/MM3 (ref 0–0.05)
IMM GRANULOCYTES NFR BLD AUTO: 0.3 % (ref 0–0.5)
LYMPHOCYTES # BLD AUTO: 3.36 10*3/MM3 (ref 0.7–3.1)
LYMPHOCYTES NFR BLD AUTO: 43.9 % (ref 19.6–45.3)
MCH RBC QN AUTO: 29.9 PG (ref 26.6–33)
MCHC RBC AUTO-ENTMCNC: 33.9 G/DL (ref 31.5–35.7)
MCV RBC AUTO: 88.2 FL (ref 79–97)
MONOCYTES # BLD AUTO: 0.81 10*3/MM3 (ref 0.1–0.9)
MONOCYTES NFR BLD AUTO: 10.6 % (ref 5–12)
NEUTROPHILS NFR BLD AUTO: 3.15 10*3/MM3 (ref 1.7–7)
NEUTROPHILS NFR BLD AUTO: 41.1 % (ref 42.7–76)
NRBC BLD AUTO-RTO: 0 /100 WBC (ref 0–0.2)
PLATELET # BLD AUTO: 644 10*3/MM3 (ref 140–450)
PMV BLD AUTO: 9.7 FL (ref 6–12)
POTASSIUM SERPL-SCNC: 4.9 MMOL/L (ref 3.5–5.2)
PROT SERPL-MCNC: 5.8 G/DL (ref 6–8.5)
RBC # BLD AUTO: 4.48 10*6/MM3 (ref 3.77–5.28)
SODIUM SERPL-SCNC: 142 MMOL/L (ref 136–145)
WBC # BLD AUTO: 7.65 10*3/MM3 (ref 3.4–10.8)

## 2021-04-06 PROCEDURE — 83516 IMMUNOASSAY NONANTIBODY: CPT | Performed by: INTERNAL MEDICINE

## 2021-04-06 PROCEDURE — 85025 COMPLETE CBC W/AUTO DIFF WBC: CPT | Performed by: INTERNAL MEDICINE

## 2021-04-06 PROCEDURE — 80053 COMPREHEN METABOLIC PANEL: CPT | Performed by: INTERNAL MEDICINE

## 2021-04-06 PROCEDURE — 25010000002 PROPOFOL 10 MG/ML EMULSION: Performed by: ANESTHESIOLOGY

## 2021-04-06 PROCEDURE — 82784 ASSAY IGA/IGD/IGG/IGM EACH: CPT | Performed by: INTERNAL MEDICINE

## 2021-04-06 PROCEDURE — 86255 FLUORESCENT ANTIBODY SCREEN: CPT | Performed by: INTERNAL MEDICINE

## 2021-04-06 PROCEDURE — 45378 DIAGNOSTIC COLONOSCOPY: CPT | Performed by: INTERNAL MEDICINE

## 2021-04-06 RX ORDER — SODIUM CHLORIDE, SODIUM LACTATE, POTASSIUM CHLORIDE, CALCIUM CHLORIDE 600; 310; 30; 20 MG/100ML; MG/100ML; MG/100ML; MG/100ML
30 INJECTION, SOLUTION INTRAVENOUS CONTINUOUS PRN
Status: DISCONTINUED | OUTPATIENT
Start: 2021-04-06 | End: 2021-04-06 | Stop reason: HOSPADM

## 2021-04-06 RX ORDER — LIDOCAINE HYDROCHLORIDE 20 MG/ML
INJECTION, SOLUTION INFILTRATION; PERINEURAL AS NEEDED
Status: DISCONTINUED | OUTPATIENT
Start: 2021-04-06 | End: 2021-04-06 | Stop reason: SURG

## 2021-04-06 RX ORDER — SODIUM CHLORIDE, SODIUM LACTATE, POTASSIUM CHLORIDE, CALCIUM CHLORIDE 600; 310; 30; 20 MG/100ML; MG/100ML; MG/100ML; MG/100ML
INJECTION, SOLUTION INTRAVENOUS CONTINUOUS PRN
Status: DISCONTINUED | OUTPATIENT
Start: 2021-04-06 | End: 2021-04-06 | Stop reason: SURG

## 2021-04-06 RX ORDER — PROPOFOL 10 MG/ML
VIAL (ML) INTRAVENOUS AS NEEDED
Status: DISCONTINUED | OUTPATIENT
Start: 2021-04-06 | End: 2021-04-06 | Stop reason: SURG

## 2021-04-06 RX ADMIN — SODIUM CHLORIDE, POTASSIUM CHLORIDE, SODIUM LACTATE AND CALCIUM CHLORIDE: 600; 310; 30; 20 INJECTION, SOLUTION INTRAVENOUS at 09:23

## 2021-04-06 RX ADMIN — PROPOFOL 370 MG: 10 INJECTION, EMULSION INTRAVENOUS at 09:26

## 2021-04-06 RX ADMIN — LIDOCAINE HYDROCHLORIDE 100 MG: 20 INJECTION, SOLUTION INFILTRATION; PERINEURAL at 09:26

## 2021-04-06 RX ADMIN — SODIUM CHLORIDE, POTASSIUM CHLORIDE, SODIUM LACTATE AND CALCIUM CHLORIDE 30 ML/HR: 600; 310; 30; 20 INJECTION, SOLUTION INTRAVENOUS at 09:20

## 2021-04-06 NOTE — ANESTHESIA POSTPROCEDURE EVALUATION
"Patient: Shavonne Dias    Procedure Summary     Date: 04/06/21 Room / Location: [facility] ENDOSCOPY 5 /  [facility]    Anesthesia Start: 0922 Anesthesia Stop: 0954    Procedure: COLONOSCOPY TO TERMINAL ILEUM (N/A ) Diagnosis:       Colon polyp      Family history of colonic polyps      (Colon polyp [K63.5])      (Family history of colonic polyps [Z83.71])    Surgeons: Michael Maldonado MD Provider: Keyur Carrasco MD    Anesthesia Type: MAC ASA Status: 2          Anesthesia Type: MAC    Vitals  Vitals Value Taken Time   /80 04/06/21 1014   Temp     Pulse 61 04/06/21 1014   Resp 16 04/06/21 1014   SpO2 100 % 04/06/21 1014           Post Anesthesia Care and Evaluation    Patient location during evaluation: bedside  Patient participation: complete - patient participated  Level of consciousness: awake and alert  Pain management: adequate  Airway patency: patent  Anesthetic complications: No anesthetic complications    Cardiovascular status: acceptable  Respiratory status: acceptable  Hydration status: acceptable    Comments: /80 (BP Location: Left arm, Patient Position: Lying)   Pulse 61   Resp 16   Ht 167.6 cm (66\")   Wt 70.3 kg (155 lb)   LMP  (LMP Unknown)   SpO2 100%   BMI 25.02 kg/m²       "

## 2021-04-06 NOTE — ANESTHESIA PREPROCEDURE EVALUATION
Anesthesia Evaluation     Patient summary reviewed and Nursing notes reviewed   NPO Solid Status: > 8 hours             Airway   Mallampati: II  TM distance: >3 FB  Neck ROM: full  no difficulty expected  Dental - normal exam     Pulmonary - normal exam   Cardiovascular - normal exam    (+) hyperlipidemia,       Neuro/Psych  (+) numbness, psychiatric history Anxiety and Depression,     GI/Hepatic/Renal/Endo      Musculoskeletal     Abdominal  - normal exam   Substance History      OB/GYN          Other                        Anesthesia Plan    ASA 2     MAC       Anesthetic plan, all risks, benefits, and alternatives have been provided, discussed and informed consent has been obtained with: patient.

## 2021-04-07 LAB
ENDOMYSIUM IGA SER QL: POSITIVE
GLIADIN PEPTIDE IGA SER-ACNC: 110 UNITS (ref 0–19)
GLIADIN PEPTIDE IGG SER-ACNC: 20 UNITS (ref 0–19)
IGA SERPL-MCNC: 323 MG/DL (ref 87–352)
TTG IGA SER-ACNC: 19 U/ML (ref 0–3)
TTG IGG SER-ACNC: 4 U/ML (ref 0–5)

## 2021-04-12 ENCOUNTER — TELEPHONE (OUTPATIENT)
Dept: GASTROENTEROLOGY | Facility: CLINIC | Age: 61
End: 2021-04-12

## 2021-04-12 NOTE — TELEPHONE ENCOUNTER
----- Message from Michael Maldonado MD sent at 4/11/2021  4:07 PM EDT -----  04/11/21  Tell her that her celiac sprue antibody panel is +. I don't have any celiac sprue antibody panel to compare to. We can discuss this in more detail when I see her in F/U in the office.         Make sure that she is going to go see Dr. Jose oRsenberg to have her anal area reevaluated and to decide if hemorrhoid surgery should be done.        FAX to her PCP.   Davidson milan

## 2021-04-12 NOTE — TELEPHONE ENCOUNTER
Called pt and advised of Dr Maldonado's note.  Advised will send message to manager to find appt with Dr Maldonado. Verb understanding and states she will reach out to Dr Rosenberg..    Results send to Dr Young thru [facility].

## 2021-04-27 ENCOUNTER — TELEPHONE (OUTPATIENT)
Dept: GASTROENTEROLOGY | Facility: CLINIC | Age: 61
End: 2021-04-27

## 2021-04-27 NOTE — TELEPHONE ENCOUNTER
----- Message from Shavonne Dias sent at 4/27/2021  2:17 PM EDT -----  Regarding: Visit Follow-Up Question  Contact: [phone]  Hi Dr Maldonado  I wanted to know if it's necessary for a f/u with you on my Colonoscopy?  I could not get Reschedule appt until July 21st.    Also when I asked Dr. Young's office last year to get my next Colonoscopy with you it wasn't for Diagnostic.  I just needed one for preventative routine based on family history and my Breast Cancer.  My hemorrhoids have been there since 1988 when Casey was born.  The occasional bleeding is only when I wipe there is a thin area that can bleed if irritated.    Can you please have your coding dept take a look at how they submitted the claim.  It's not being covered this time 100% and all the other ones were covered.  You can call me at [phone].  AlCould not get appt with Dr Pro Rosenberg until July 23rd.

## 2021-04-27 NOTE — TELEPHONE ENCOUNTER
I called her to discuss the history that I wrote on the colonoscopy report just to make sure that it was accurate (h/o colon polyps, etc). I could only leave a message. See if you could schedule her to see me in the office in the next one month to discuss if she would like. Thx.kjh

## 2021-04-28 NOTE — TELEPHONE ENCOUNTER
"I called the patient.       Is there anyway to see if our office could rebill her colonoscopy as a \"High Risk Screening Colonoscopy\" because of her h/o colon polyps and FH of colon polyps. I told her that this may not work but we could try this since previous colonoscopies have been pain for completely.       Also is there anyway we could ask Dr. Jsoe Rosenberg's office to see her sooner than the scheduled 7/21 appointment. The patient is up tight when I said that her internal hemorrrhoids looked \"funny\" and that I wanted Dr. Jose Rosenberg to look at her anal area.        I also discussed her labs with her. She will try to stick to a strict gluten free diet.       FAX to her PCP. Roseann. kayli  "

## 2021-04-28 NOTE — TELEPHONE ENCOUNTER
Called pt and advised of Dr Maldonado's note. Pt verb understanding.     Pt is asking if she needs to f/u with Dr Maldonado and also pt has appt with Dr Rosenberg in July and pt is asking if this is soon enough or should she be seen sooner. Advised Dr Maldonado is out of the office this week but will send message to him but it will be next week when we get back with her. Pt verb understanding and states this is ok.

## 2021-04-29 NOTE — TELEPHONE ENCOUNTER
Pt's message forwarded to manager regarding billing and to Reanna in scheduling regarding Dr Rosenberg's appt.     Copy of Dr Maldonado's note sent to Dr Young thru [facility].

## 2021-07-19 ENCOUNTER — OFFICE VISIT (OUTPATIENT)
Dept: GASTROENTEROLOGY | Facility: CLINIC | Age: 61
End: 2021-07-19

## 2021-07-19 VITALS
WEIGHT: 154 LBS | HEIGHT: 66 IN | SYSTOLIC BLOOD PRESSURE: 110 MMHG | DIASTOLIC BLOOD PRESSURE: 70 MMHG | BODY MASS INDEX: 24.75 KG/M2

## 2021-07-19 DIAGNOSIS — Z83.71 FAMILY HISTORY OF COLONIC POLYPS: ICD-10-CM

## 2021-07-19 DIAGNOSIS — K63.5 POLYP OF COLON, UNSPECIFIED PART OF COLON, UNSPECIFIED TYPE: Primary | ICD-10-CM

## 2021-07-19 DIAGNOSIS — K90.0 CELIAC SPRUE: ICD-10-CM

## 2021-07-19 PROCEDURE — 99213 OFFICE O/P EST LOW 20 MIN: CPT | Performed by: INTERNAL MEDICINE

## 2021-07-19 RX ORDER — NIACIN
POWDER (GRAM) MISCELLANEOUS AS NEEDED
COMMUNITY
End: 2022-04-26

## 2021-07-19 NOTE — PROGRESS NOTES
Chief Complaint   Patient presents with   • Follow-up       History of Present Illness:   60 y.o. female who has a history of celiac sprue and has had breast cancer with a h/o colon polyps. Her mom and dad had colon polyps.  Her last colonoscopy was 4/6/21 and showed:  - External hemorrhoids found on perianal exam.  - The examined portion of the ileum was normal.  - Probable internal hemorrhoids.  - The examination was otherwise normal.  - No specimens collected.       I recommended that she go see Dr. Dominique Rosenberg because of the funny looking anal area that probably is internal hemorrhoids?  I had recommended that she have a repeat colonoscopy in 5 years.        No diarrhea. No abdominal or chest pain. No nausea or vomting. Rare indigestion. NO rectal bleeding or melena.     Past Medical History:   Diagnosis Date   • Breast cancer (CMS/HCC) 2006    RT Breast   • Depression    • Gluten intolerance    • History of blood in urine    • History of colon polyps    • History of infectious mononucleosis    • History of palpitations    • History of recurrent UTI (urinary tract infection)    • History of varicose veins    • Thrombocytosis (CMS/HCC)        Past Surgical History:   Procedure Laterality Date   • BREAST BIOPSY Right 2006   • COLONOSCOPY N/A 03/09/2012    polyps, [facility]PATH: tubulosvillous adenoma with low dysplasia   • COLONOSCOPY  09/11/2015    one 4mm polyp in the transverse colon, [facility]. PATH: Adenomatous polyp with low grade dsyplasia.    • COLONOSCOPY N/A 4/6/2021    Procedure: COLONOSCOPY TO TERMINAL ILEUM;  Surgeon: Michael Maldonado MD;  Location: [facility] ENDOSCOPY;  Service: Gastroenterology;  Laterality: N/A;  PREOP/ PERSONAL AND FAMILY HX COLON CANCER  POSTOP/ EXTERNAL AND INTERNAL HEMORRHOIDS   • DILATATION AND CURETTAGE N/A 10/26/2010   • ENDOSCOPY  03/09/2012    Erythema.  PATh: Patchy mild chronic gastritis   • MASTECTOMY Bilateral 07/01/2006   • PANCREATIC CYST EXCISION N/A 03/2010   • SPLENECTOMY N/A  2010         Current Outpatient Medications:   •  buPROPion XL (WELLBUTRIN XL) 150 MG 24 hr tablet, TAKE 1 TABLET DAILY IN ADDITION TO PAXIL, Disp: 90 tablet, Rfl: 3  •  Cholecalciferol (VITAMIN D) 1000 units tablet, Take 1,000 Units by mouth Daily., Disp: , Rfl:   •  cyclobenzaprine (FLEXERIL) 10 MG tablet, Take 1 tablet by mouth 3 (Three) Times a Day As Needed for Muscle Spasms., Disp: 30 tablet, Rfl: 0  •  fluticasone (FLONASE) 50 MCG/ACT nasal spray, 2 sprays into the nostril(s) as directed by provider Daily. (Patient taking differently: 2 sprays into the nostril(s) as directed by provider As Needed.), Disp: 1 bottle, Rfl: 0  •  guaifenesin-dextromethorphan (MUCINEX DM)  MG tablet sustained-release 12 hour tablet, Take 1 tablet by mouth 2 (Two) Times a Day. (Patient taking differently: Take 1 tablet by mouth As Needed.), Disp: 30 tablet, Rfl: 0  •  Multiple Vitamins-Minerals (MULTI-VITAMIN GUMMIES PO), Take  by mouth., Disp: , Rfl:   •  mupirocin (BACTROBAN) 2 % ointment, APPLY TO AFFECTED AREA 3 TIMES A DAY FOR 7 DAYS, Disp: , Rfl: 1  •  Niacin powder, As Needed. Energy supplement, Disp: , Rfl:   •  PARoxetine (PAXIL) 10 MG tablet, Take 10 mg by mouth Daily., Disp: , Rfl:   •  triamcinolone (KENALOG) 0.1 % ointment, Apply  topically to the appropriate area as directed 2 (Two) Times a Day., Disp: 1 tube, Rfl: 0    Allergies   Allergen Reactions   • Morphine And Related Other (See Comments)     Low heart rate       Family History   Problem Relation Age of Onset   • Uterine cancer Mother          age 82   • Tuberculosis Mother 19   • Dementia Mother    • COPD Father          age 70   • Skin cancer Father    • Heart disease Other 65   • Breast cancer Paternal Grandmother    • Coronary artery disease Brother 68       Social History     Socioeconomic History   • Marital status:      Spouse name: Michael   • Number of children: 2   • Years of education: Not on file   • Highest education level:  Not on file   Tobacco Use   • Smoking status: Former Smoker     Years: 7.00     Types: Cigarettes     Quit date: 1985     Years since quittin.5   • Smokeless tobacco: Never Used   Vaping Use   • Vaping Use: Never used   Substance and Sexual Activity   • Alcohol use: Yes     Comment: Moderate   • Drug use: No   • Sexual activity: Yes     Partners: Male       Review of Systems   Gastrointestinal: Negative for abdominal pain.   All other systems reviewed and are negative.    Pertinent positives and negatives documented in the HPI and all other systems reviewed and were found to be negative.  Vitals:    21 1041   BP: 110/70       Physical Exam  Vitals reviewed.   Constitutional:       General: She is not in acute distress.     Appearance: Normal appearance. She is well-developed. She is not diaphoretic.   HENT:      Head: Normocephalic and atraumatic. Hair is normal.      Right Ear: Hearing, tympanic membrane, ear canal and external ear normal. No decreased hearing noted. No drainage.      Left Ear: Hearing, tympanic membrane, ear canal and external ear normal. No decreased hearing noted.      Nose: Nose normal. No nasal deformity.      Mouth/Throat:      Mouth: Mucous membranes are moist.   Eyes:      General: Lids are normal.         Right eye: No discharge.         Left eye: No discharge.      Extraocular Movements: Extraocular movements intact.      Conjunctiva/sclera: Conjunctivae normal.      Pupils: Pupils are equal, round, and reactive to light.   Neck:      Thyroid: No thyromegaly.      Vascular: No JVD.      Trachea: No tracheal deviation.   Cardiovascular:      Rate and Rhythm: Normal rate and regular rhythm.      Pulses: Normal pulses.      Heart sounds: Normal heart sounds. No murmur heard.   No friction rub. No gallop.    Pulmonary:      Effort: Pulmonary effort is normal. No respiratory distress.      Breath sounds: Normal breath sounds. No wheezing or rales.   Chest:      Chest wall: No  tenderness.   Abdominal:      General: Bowel sounds are normal. There is no distension.      Palpations: Abdomen is soft. There is no mass.      Tenderness: There is no abdominal tenderness. There is no guarding or rebound.      Hernia: No hernia is present.   Musculoskeletal:         General: No tenderness or deformity. Normal range of motion.      Cervical back: Normal range of motion and neck supple.   Lymphadenopathy:      Cervical: No cervical adenopathy.   Skin:     General: Skin is warm and dry.      Findings: No erythema or rash.   Neurological:      Mental Status: She is alert and oriented to person, place, and time.      Cranial Nerves: No cranial nerve deficit.      Motor: No abnormal muscle tone.      Coordination: Coordination normal.      Deep Tendon Reflexes: Reflexes are normal and symmetric. Reflexes normal.   Psychiatric:         Mood and Affect: Mood normal.         Behavior: Behavior normal.         Thought Content: Thought content normal.         Judgment: Judgment normal.         Diagnoses and all orders for this visit:    1. Polyp of colon, unspecified part of colon, unspecified type (Primary)    2. Family history of colonic polyps    3. Celiac sprue      Assessment:  1. Celiac sprue - has not been on a strict gluten free diet. Her pro has been mildly low.  2. H/o colon polyps.  3. F/u (mom) colon polyps.  4.     Recommendations:  1. Stick to a strict gluten free diet.   2. Go see Dr. Jose Rosenberg (this Friday)   3. Repeat colonoscopy in 5 yrs.     Return in about 5 years (around 7/19/2026).    Michael Maldonado MD  7/19/2021

## 2021-07-23 ENCOUNTER — OFFICE VISIT (OUTPATIENT)
Dept: SURGERY | Facility: CLINIC | Age: 61
End: 2021-07-23

## 2021-07-23 VITALS
SYSTOLIC BLOOD PRESSURE: 108 MMHG | HEIGHT: 66 IN | BODY MASS INDEX: 25.18 KG/M2 | DIASTOLIC BLOOD PRESSURE: 80 MMHG | HEART RATE: 72 BPM | WEIGHT: 156.7 LBS | OXYGEN SATURATION: 94 % | TEMPERATURE: 97.5 F

## 2021-07-23 DIAGNOSIS — K64.8 INTERNAL HEMORRHOIDS WITH COMPLICATION: Primary | ICD-10-CM

## 2021-07-23 PROCEDURE — 99244 OFF/OP CNSLTJ NEW/EST MOD 40: CPT | Performed by: COLON & RECTAL SURGERY

## 2021-07-23 PROCEDURE — 46600 DIAGNOSTIC ANOSCOPY SPX: CPT | Performed by: COLON & RECTAL SURGERY

## 2021-07-23 RX ORDER — HYDROCORTISONE 25 MG/G
CREAM TOPICAL
Qty: 30 G | Refills: 1 | Status: SHIPPED | OUTPATIENT
Start: 2021-07-23 | End: 2022-03-21

## 2021-07-23 NOTE — PROGRESS NOTES
Shavonne Dias is a 60 y.o. female who is seen as a consult at the request of Samra Maldonado MD for Rectal Bleeding.      HPI:  S/P colonoscopy 2021 performed by Dr. Maldonado  Colonoscopy was normal except for enlarged external hemorrhoids and an area concerning for polyp vs internal hemorrhoid.     Today, Pt is asymptomatic, but states that she has had occasional small amounts of RB with hard stools a few months ago. Concern for hemorrhoids. Worsens with sitting on ground and riding her horse. Soaking in tube with improvement in symptoms.   Pt states that she is gluten intolerant, but did not eat a gluten free diet over the winter and contributes her constipation on poor diet. Since restarting a gluten free diet a few months ago, she denies any constipation, hard stools, or RB.     Denies any rectal pain.   No SS, fiber, or laxatives   No creams.     . Vaginal delivery x2 with perineal tearing/episotomy with both deliveries.   Denies any abnormal pap smears.    Hx of breast cancer c/p bilateral mastectomy with sentinel node biopsy (negative) for infiltrative mammary carcinoma (2006). Underwent 4 rounds of chemotherapy.     Mother and father with Hx of colon polyps. No known Hx of colon cancer.     Past Medical History:   Diagnosis Date   • Abdominal bloating 2017   • Allergic rhinitis 2019   • Anxiety 2019   • Breast cancer (CMS/HCC)     RIGHT BREAST, ER(+), S/P MASTECTOMY   • Celiac sprue    • Cellulitis 2013    SEEN AT [facility]   • Cellulitis of abdominal wall 2020    D/T BUG BITE, SEEN AT [facility]   • Chronic fatigue 2018    need incr exercise   • Chronic right-sided low back pain with right-sided sciatica 2019    mild - call if worse   • Colon polyps     FOLLOWED BY DR. SAMRA MALDONADO   • Cystitis 2017   • DUB (dysfunctional uterine bleeding) 2013   • Fracture of finger of right hand 2015    OPEN FX, 4TH FINGER, MIDDLE PHALANX, DUE TO DOG BITE, SEEN AT   [facility]   • Gluten intolerance    • Hair loss 12/2016   • Hematuria 01/2015   • Hemorrhoids    • History of infectious mononucleosis 1968   • Hyperlipemia 4/14/2017   • Monopolar depression (CMS/HCC)    • Mood disorder (CMS/HCC) 7/5/2019    restart addition wellbutrin to paxil - call if problems   • Palpitations    • Pancreas cyst 2010   • PMB (postmenopausal bleeding) 08/2013   • Recurrent UTI    • Slow transit constipation 8/1/2018   • Stress incontinence, female 02/2017   • Thrombocytosis (CMS/HCC) 02/2017   • Varicose vein of leg        Past Surgical History:   Procedure Laterality Date   • BREAST BIOPSY Right 06/22/2006    (+) INFILTRATIVE MAMMARY CARCINOMA, DR. THOMAS TORO AT [facility]   • COLONOSCOPY N/A 09/11/2015    one 4mm polyp in the transverse colon, [facility]. PATH: Adenomatous polyp with low grade dsyplasia, DR. SAMRA COTA AT [facility]   • COLONOSCOPY N/A 4/6/2021    EXTERNAL AND INTERNAL HEMORRHOIDS-MODERATE, RESCOPE IN 5 YRS, DR. SAMRA COTA AT [facility]   • COLONOSCOPY W/ POLYPECTOMY N/A 10/12/2009    1 TUBULAR ADENOMA POLYP IN CECUM, 1 BENIGN POLYP IN ASCENDING, INTERNAL HEMORRHOIDS, DR. SAMRA COTA AT [facility]   • DILATATION AND CURETTAGE N/A 10/26/2010    BENIGN, DR. JOSEP RAI   • ENDOSCOPY AND COLONOSCOPY N/A 03/09/2012    CHRONIC GASTRITIS, 1 TUBULOVILLOUS ADENOMA POLYP AT 35 CM, 1 TUBULOVILLOUS ADENOMA POLYP AT 15CM, DR.KEVIN COTA AT [facility]   • MASTECTOMY W/ SENTINEL NODE BIOPSY Bilateral 07/17/2006    BILATERAL MASTECTOMY WITH RIGHT SENTINEL NODE BIOPSY, DR. THOMAS TORO AT [facility]   • PANCREATIC CYST EXCISION Bilateral 03/25/2010    LAPAROSCOPIC DISTAL PANCREATECTOMY, DR. MATT BRAY AT [facility]   • SHOULDER ARTHROSCOPY W/ ROTATOR CUFF REPAIR Right    • SPLENECTOMY N/A 03/25/2010    WITH DIAGNOSTIC LAPAROSCOPY AND OMENECTOMY, DR.ROBERT BRAY AT [facility]   • VAGINAL DELIVERY N/A 02/19/1991    DR. LIRIANO   • VAGINAL DELIVERY N/A 02/08/1988    DR. LIRIANO       Social History:   reports that she quit smoking about  36 years ago. Her smoking use included cigarettes. She quit after 10.00 years of use. She has never used smokeless tobacco. She reports current alcohol use. She reports that she does not use drugs.      Marriage status:     Family History   Problem Relation Age of Onset   • Uterine cancer Mother          age 82   • Tuberculosis Mother 19   • Dementia Mother    • Skin cancer Mother    • Cancer Mother    • COPD Father          age 70   • Skin cancer Father    • Cancer Father    • Colon polyps Father    • Breast cancer Paternal Grandmother    • Cancer Paternal Grandmother    • Coronary artery disease Brother 68   • Heart disease Brother          Current Outpatient Medications:   •  buPROPion XL (WELLBUTRIN XL) 150 MG 24 hr tablet, TAKE 1 TABLET DAILY IN ADDITION TO PAXIL, Disp: 90 tablet, Rfl: 3  •  Cholecalciferol (VITAMIN D) 1000 units tablet, Take 1,000 Units by mouth Daily., Disp: , Rfl:   •  cyclobenzaprine (FLEXERIL) 10 MG tablet, Take 1 tablet by mouth 3 (Three) Times a Day As Needed for Muscle Spasms., Disp: 30 tablet, Rfl: 0  •  fluticasone (FLONASE) 50 MCG/ACT nasal spray, 2 sprays into the nostril(s) as directed by provider Daily. (Patient taking differently: 2 sprays into the nostril(s) as directed by provider As Needed.), Disp: 1 bottle, Rfl: 0  •  guaifenesin-dextromethorphan (MUCINEX DM)  MG tablet sustained-release 12 hour tablet, Take 1 tablet by mouth 2 (Two) Times a Day. (Patient taking differently: Take 1 tablet by mouth As Needed.), Disp: 30 tablet, Rfl: 0  •  Multiple Vitamins-Minerals (MULTI-VITAMIN GUMMIES PO), Take  by mouth., Disp: , Rfl:   •  mupirocin (BACTROBAN) 2 % ointment, APPLY TO AFFECTED AREA 3 TIMES A DAY FOR 7 DAYS, Disp: , Rfl: 1  •  Niacin powder, As Needed. Energy supplement, Disp: , Rfl:   •  PARoxetine (PAXIL) 10 MG tablet, Take 10 mg by mouth Daily., Disp: , Rfl:   •  triamcinolone (KENALOG) 0.1 % ointment, Apply  topically to the appropriate area as  directed 2 (Two) Times a Day., Disp: 1 tube, Rfl: 0  •  Hydrocortisone, Perianal, (Anusol-HC) 2.5 % rectal cream, Apply rectally 3 times daily.  Include applicator., Disp: 30 g, Rfl: 1    Allergy  Morphine and related    Review of Systems   Constitutional: Positive for weight gain. Negative for decreased appetite.   HENT: Negative for congestion, hearing loss and hoarse voice.    Eyes: Negative for blurred vision, discharge and visual disturbance.   Cardiovascular: Negative for chest pain, cyanosis and leg swelling.   Respiratory: Negative for cough, shortness of breath, sleep disturbances due to breathing and snoring.    Endocrine: Negative for cold intolerance and heat intolerance.   Hematologic/Lymphatic: Bruises/bleeds easily.   Skin: Negative for itching, poor wound healing and skin cancer.   Musculoskeletal: Positive for back pain. Negative for arthritis, joint pain and joint swelling.   Gastrointestinal: Negative for abdominal pain, change in bowel habit, bowel incontinence and constipation.   Genitourinary: Positive for bladder incontinence. Negative for dysuria and hematuria.   Neurological: Negative for brief paralysis, excessive daytime sleepiness, dizziness, focal weakness, headaches, light-headedness and weakness.   Psychiatric/Behavioral: Negative for altered mental status and hallucinations. The patient does not have insomnia.    Allergic/Immunologic: Negative for HIV exposure and persistent infections.   All other systems reviewed and are negative.      Vitals:    07/23/21 0841   BP: 108/80   Pulse: 72   Temp: 97.5 °F (36.4 °C)   SpO2: 94%     Body mass index is 25.29 kg/m².    Physical Exam  Exam conducted with a chaperone present.   Constitutional:       General: She is not in acute distress.     Appearance: She is well-developed.   HENT:      Head: Normocephalic and atraumatic.      Nose: Nose normal.   Eyes:      Conjunctiva/sclera: Conjunctivae normal.      Pupils: Pupils are equal, round, and  reactive to light.   Neck:      Trachea: No tracheal deviation.   Pulmonary:      Effort: Pulmonary effort is normal. No respiratory distress.      Breath sounds: Normal breath sounds.   Abdominal:      General: Bowel sounds are normal. There is no distension.      Palpations: Abdomen is soft.   Genitourinary:     Comments: Perianal exam: Skin Tags   HERMELINDO- good tone, no masses  Anoscopy performed:  Grade 2 x 2 internal hem.     Musculoskeletal:         General: No deformity. Normal range of motion.      Cervical back: Normal range of motion.   Skin:     General: Skin is warm and dry.   Neurological:      Mental Status: She is alert and oriented to person, place, and time.      Cranial Nerves: No cranial nerve deficit.      Coordination: Coordination normal.      Gait: Gait normal.   Psychiatric:         Behavior: Behavior normal.         Judgment: Judgment normal.         Review of Medical Record:  I reviewed colonoscopy from 04/06/2021  - Enlarged External hemorrhoids   - Irritated Internal hemorrhoid with overlying mucous.   - Otherwise, WNL    Assessment:  1. Internal hemorrhoids with complication      Plan:  - Rx for Anusol-HC 2.5%. Instructed Pt to apply cream internally and externally TID x 1 week  - Start Fiber  - Recommended Colace, Miralax, or Mg prn for constipation  - Follow up in 4 weeks for reevaluation       Scribed for Jose Rosenberg MD by Crystal Agrawal PA-C 7/25/2021  18:04 EDT \This patient was evaluated by me, recommendations made, documentation reviewed, edited, and revised by me, Jose Rosenberg MD

## 2021-08-30 ENCOUNTER — OFFICE VISIT (OUTPATIENT)
Dept: INTERNAL MEDICINE | Facility: CLINIC | Age: 61
End: 2021-08-30

## 2021-08-30 VITALS
DIASTOLIC BLOOD PRESSURE: 78 MMHG | WEIGHT: 156 LBS | SYSTOLIC BLOOD PRESSURE: 128 MMHG | BODY MASS INDEX: 25.07 KG/M2 | OXYGEN SATURATION: 99 % | HEART RATE: 69 BPM | TEMPERATURE: 97.8 F | HEIGHT: 66 IN

## 2021-08-30 DIAGNOSIS — Z82.49 FAMILY HISTORY OF HEART DISEASE: ICD-10-CM

## 2021-08-30 DIAGNOSIS — Z00.00 ANNUAL PHYSICAL EXAM: Primary | ICD-10-CM

## 2021-08-30 DIAGNOSIS — E78.49 OTHER HYPERLIPIDEMIA: ICD-10-CM

## 2021-08-30 DIAGNOSIS — Z78.0 MENOPAUSE: ICD-10-CM

## 2021-08-30 DIAGNOSIS — Z83.71 FAMILY HISTORY OF COLONIC POLYPS: ICD-10-CM

## 2021-08-30 LAB
ALBUMIN SERPL-MCNC: 4.5 G/DL (ref 3.5–5.2)
ALBUMIN/GLOB SERPL: 1.7 G/DL
ALP SERPL-CCNC: 95 U/L (ref 39–117)
ALT SERPL-CCNC: 20 U/L (ref 1–33)
APPEARANCE UR: CLEAR
AST SERPL-CCNC: 23 U/L (ref 1–32)
BACTERIA #/AREA URNS HPF: ABNORMAL /HPF
BASOPHILS # BLD AUTO: 0.14 10*3/MM3 (ref 0–0.2)
BASOPHILS NFR BLD AUTO: 1.5 % (ref 0–1.5)
BILIRUB SERPL-MCNC: 0.4 MG/DL (ref 0–1.2)
BILIRUB UR QL STRIP: NEGATIVE
BUN SERPL-MCNC: 16 MG/DL (ref 8–23)
BUN/CREAT SERPL: 20.3 (ref 7–25)
CALCIUM SERPL-MCNC: 10.2 MG/DL (ref 8.6–10.5)
CASTS URNS MICRO: ABNORMAL
CHLORIDE SERPL-SCNC: 102 MMOL/L (ref 98–107)
CHOLEST SERPL-MCNC: 242 MG/DL (ref 0–200)
CO2 SERPL-SCNC: 23.9 MMOL/L (ref 22–29)
COLOR UR: YELLOW
CREAT SERPL-MCNC: 0.79 MG/DL (ref 0.57–1)
EOSINOPHIL # BLD AUTO: 0.21 10*3/MM3 (ref 0–0.4)
EOSINOPHIL NFR BLD AUTO: 2.2 % (ref 0.3–6.2)
EPI CELLS #/AREA URNS HPF: ABNORMAL /HPF
ERYTHROCYTE [DISTWIDTH] IN BLOOD BY AUTOMATED COUNT: 12.9 % (ref 12.3–15.4)
GLOBULIN SER CALC-MCNC: 2.7 GM/DL
GLUCOSE SERPL-MCNC: 96 MG/DL (ref 65–99)
GLUCOSE UR QL: NEGATIVE
HCT VFR BLD AUTO: 45.3 % (ref 34–46.6)
HDLC SERPL-MCNC: 63 MG/DL (ref 40–60)
HGB BLD-MCNC: 15.2 G/DL (ref 12–15.9)
HGB UR QL STRIP: NEGATIVE
IMM GRANULOCYTES # BLD AUTO: 0.02 10*3/MM3 (ref 0–0.05)
IMM GRANULOCYTES NFR BLD AUTO: 0.2 % (ref 0–0.5)
KETONES UR QL STRIP: NEGATIVE
LDLC SERPL CALC-MCNC: 165 MG/DL (ref 0–100)
LEUKOCYTE ESTERASE UR QL STRIP: ABNORMAL
LYMPHOCYTES # BLD AUTO: 4.83 10*3/MM3 (ref 0.7–3.1)
LYMPHOCYTES NFR BLD AUTO: 50.2 % (ref 19.6–45.3)
MCH RBC QN AUTO: 29.6 PG (ref 26.6–33)
MCHC RBC AUTO-ENTMCNC: 33.6 G/DL (ref 31.5–35.7)
MCV RBC AUTO: 88.1 FL (ref 79–97)
MONOCYTES # BLD AUTO: 0.88 10*3/MM3 (ref 0.1–0.9)
MONOCYTES NFR BLD AUTO: 9.1 % (ref 5–12)
NEUTROPHILS # BLD AUTO: 3.54 10*3/MM3 (ref 1.7–7)
NEUTROPHILS NFR BLD AUTO: 36.8 % (ref 42.7–76)
NITRITE UR QL STRIP: NEGATIVE
NRBC BLD AUTO-RTO: 0 /100 WBC (ref 0–0.2)
PH UR STRIP: 6.5 [PH] (ref 5–8)
PLATELET # BLD AUTO: 660 10*3/MM3 (ref 140–450)
POTASSIUM SERPL-SCNC: 5.7 MMOL/L (ref 3.5–5.2)
PROT SERPL-MCNC: 7.2 G/DL (ref 6–8.5)
PROT UR QL STRIP: NEGATIVE
RBC # BLD AUTO: 5.14 10*6/MM3 (ref 3.77–5.28)
RBC #/AREA URNS HPF: ABNORMAL /HPF
SODIUM SERPL-SCNC: 140 MMOL/L (ref 136–145)
SP GR UR: <1.005 (ref 1–1.03)
TRIGL SERPL-MCNC: 84 MG/DL (ref 0–150)
UROBILINOGEN UR STRIP-MCNC: ABNORMAL MG/DL
VLDLC SERPL CALC-MCNC: 14 MG/DL (ref 5–40)
WBC # BLD AUTO: 9.62 10*3/MM3 (ref 3.4–10.8)
WBC #/AREA URNS HPF: ABNORMAL /HPF

## 2021-08-30 PROCEDURE — 99396 PREV VISIT EST AGE 40-64: CPT | Performed by: INTERNAL MEDICINE

## 2021-08-30 NOTE — PROGRESS NOTES
"Chief Complaint  Annual Exam (Physical)    Subjective          Shavonne Dias presents to [facility] for   History of Present Illness    Review of Systems   Constitutional: Negative for appetite change, chills, fatigue, fever and unexpected weight change.   HENT: Negative for congestion, ear pain, mouth sores, sinus pain, sore throat, tinnitus, trouble swallowing and voice change.    Eyes: Negative for pain and visual disturbance.   Respiratory: Negative for cough, choking, shortness of breath and wheezing.    Cardiovascular: Negative for chest pain, palpitations and leg swelling.   Gastrointestinal: Negative for abdominal pain, blood in stool, constipation, diarrhea, nausea and vomiting.   Endocrine: Negative for cold intolerance, heat intolerance, polydipsia and polyuria.   Genitourinary: Negative for difficulty urinating, dysuria, enuresis, flank pain, frequency, hematuria, urgency and vaginal bleeding.   Musculoskeletal: Positive for back pain (aches off & on). Negative for arthralgias, gait problem, joint swelling, myalgias, neck pain and neck stiffness.   Skin: Negative for color change and rash.   Allergic/Immunologic: Positive for environmental allergies (mild). Negative for food allergies and immunocompromised state.   Neurological: Negative for dizziness, tremors, seizures, syncope, speech difficulty, weakness, numbness and headaches.   Hematological: Negative for adenopathy. Bruises/bleeds easily.   Psychiatric/Behavioral: Negative for agitation, confusion, decreased concentration, dysphoric mood, sleep disturbance and suicidal ideas. The patient is not nervous/anxious.         Objective   Vital Signs:   /78 (BP Location: Left arm, Patient Position: Sitting, Cuff Size: Adult)   Pulse 69   Temp 97.8 °F (36.6 °C)   Ht 167.6 cm (66\")   Wt 70.8 kg (156 lb)   SpO2 99%   BMI 25.18 kg/m²     Physical Exam  Vitals and nursing note reviewed.   Constitutional:       " Appearance: She is well-developed.   HENT:      Right Ear: Hearing, tympanic membrane, ear canal and external ear normal.      Left Ear: Hearing, tympanic membrane, ear canal and external ear normal.      Nose:      Right Sinus: No maxillary sinus tenderness or frontal sinus tenderness.      Left Sinus: No maxillary sinus tenderness or frontal sinus tenderness.   Eyes:      General: Lids are normal.      Conjunctiva/sclera: Conjunctivae normal.      Pupils: Pupils are equal, round, and reactive to light.   Neck:      Thyroid: No thyroid mass or thyromegaly.      Vascular: No carotid bruit or JVD.      Trachea: Trachea normal. No tracheal deviation.   Cardiovascular:      Rate and Rhythm: Normal rate and regular rhythm.      Pulses:           Carotid pulses are 2+ on the right side and 2+ on the left side.       Radial pulses are 2+ on the right side and 2+ on the left side.        Dorsalis pedis pulses are 2+ on the right side and 2+ on the left side.        Posterior tibial pulses are 2+ on the right side and 2+ on the left side.      Heart sounds: S1 normal and S2 normal. No murmur heard.   No friction rub. No gallop.    Pulmonary:      Effort: Pulmonary effort is normal.      Breath sounds: Normal breath sounds.   Chest:      Chest wall: There is no dullness to percussion.      Breasts:         Right: No inverted nipple, mass, nipple discharge, skin change or tenderness.         Left: No inverted nipple, mass, nipple discharge, skin change or tenderness.   Abdominal:      General: Bowel sounds are normal. There is no abdominal bruit.      Palpations: Abdomen is soft.      Tenderness: There is no abdominal tenderness. There is no guarding or rebound.      Hernia: No hernia is present.   Musculoskeletal:         General: Normal range of motion.      Cervical back: Neck supple.   Lymphadenopathy:      Cervical: No cervical adenopathy.      Upper Body:      Right upper body: No supraclavicular adenopathy.      Left  upper body: No supraclavicular adenopathy.   Skin:     General: Skin is warm and dry.   Neurological:      Mental Status: She is alert.      Cranial Nerves: No cranial nerve deficit.      Sensory: No sensory deficit.      Deep Tendon Reflexes:      Reflex Scores:       Patellar reflexes are 2+ on the right side and 2+ on the left side.       Result Review :                 Assessment and Plan    Problem List Items Addressed This Visit        Unprioritized    Hyperlipemia    Current Assessment & Plan     Lipid abnormalities are unchanged.  Nutritional counseling was provided.  Lipids will be reassessed in 6 months.         Family history of heart disease    Family history of colonic polyps    Overview     Added automatically from request for surgery [id number]           Other Visit Diagnoses     Annual physical exam    -  Primary    Relevant Orders    CBC & Differential    Comprehensive Metabolic Panel    Lipid Panel    Urinalysis With Microscopic If Indicated (No Culture) - Urine, Clean Catch    Menopause        Relevant Orders    DEXA Bone Density Axial          Follow Up   Return in about 6 months (around 2/28/2022) for Recheck.  Patient was given instructions and counseling regarding her condition or for health maintenance advice. Please see specific information pulled into the AVS if appropriate.      Discussed need to stay up to date on screening exams as indicated on sex, age & risk factors. I encouraged healthy weight maintenance with diet & exercise. Need good sleep habits & continue to avoid tobacco & excessive alcohol.     Need shingrix.

## 2021-08-30 NOTE — PATIENT INSTRUCTIONS

## 2021-08-31 DIAGNOSIS — E87.5 POTASSIUM (K) EXCESS: Primary | ICD-10-CM

## 2021-08-31 NOTE — PROGRESS NOTES
High cholesterol - need low fat diet & exercise. Need to consider chol medications. High potassium is likely not significant. Avoid taking potassium supplement & recheck 4-6 wks.

## 2021-09-13 ENCOUNTER — CLINICAL SUPPORT (OUTPATIENT)
Dept: INTERNAL MEDICINE | Facility: CLINIC | Age: 61
End: 2021-09-13

## 2021-09-13 DIAGNOSIS — Z78.0 MENOPAUSE: ICD-10-CM

## 2021-09-13 PROCEDURE — 77080 DXA BONE DENSITY AXIAL: CPT | Performed by: INTERNAL MEDICINE

## 2021-09-16 ENCOUNTER — TELEPHONE (OUTPATIENT)
Dept: SURGERY | Facility: CLINIC | Age: 61
End: 2021-09-16

## 2021-09-16 NOTE — TELEPHONE ENCOUNTER
Sent via [facility].  Seems like she didn't come back in 4 weeks (No Show) 08/20/2021, tomorrow will be a total of 8 weeks. Should I go ahead and cancel appt if pt has no issues?    Please advise.    Thank you.    EDI Monge/Vanessa.    ----- Message from Shavonne Dias sent at 9/16/2021  2:34 PM EDT -----  Regarding: Test Results Question  Contact: [phone]  Vickey Davenport,    If I don't have any issues to report for tomorrow appt, could we just wait and see if I do have an issue.  I believe my blood work came back okay?  Just let me know so I can save a trip for now.  Thanks    No bleeding or pains    Shavonne Dias

## 2021-09-16 NOTE — TELEPHONE ENCOUNTER
Phoned patient and she is aware we will be cancelling her appt for 2morrow, Vanessa Juradojuan ramon will cancel.    Thank you.

## 2021-10-03 NOTE — TELEPHONE ENCOUNTER
"Call from Vane at  Radiology @ [facility] @ [phone].   States pt coming in for CT abd/pelvis tomorrow, which has been ordered with and without contrast.  States that no added benefit to doing \"without contrast\" portion of testing, and this exposes pt to double radiation.  Asking if this necessary.  Message to DR Maldonado.  "
I spoke to Dr. Quintana (one of the Radiologists at [facility]) about doing a CT of the abdomen and pelvis with pancreatic protocol.  Given her IPMN with distal pancreatectomy, Dr. Quintana thinks that an MRI of her pancreas would be a better study than a CAT scan of the abdomen and pelvis with pancreatic protocol.  I have canceled the CAT scan of the abdomen and pelvis with pancreatic protocol and asked that a an MRI of the pancreas with contrast be done.       I called the patient twice to let her know that I have canceled the CAT scan, but have been unable to reach her.    MT/SA - please call the patient to make sure that she understands that I have canceled the CAT scan of the abdomen and pelvis that is scheduled for 8/23/2017.  I am going to schedule an MRI of the pancreas to be done. Radiology Scheduling should call her to schedule it on another day. Thx. kjh    
VM to pt - identifies as Daphney Dias.  Message left per DR Maldonado that CT scheduled for tomorrow has been cancelled,  and MRI has been ordered.  [facility] will be contacting her to arrange.  Contact this office if any questions.    Call to  Vane at  Radiology - she is aware of changes.  
- - -

## 2021-10-19 RX ORDER — ATORVASTATIN CALCIUM 10 MG/1
10 TABLET, FILM COATED ORAL DAILY
Qty: 30 TABLET | Refills: 4 | Status: SHIPPED | OUTPATIENT
Start: 2021-10-19 | End: 2021-12-01 | Stop reason: SDUPTHER

## 2021-12-01 DIAGNOSIS — E78.49 OTHER HYPERLIPIDEMIA: Primary | ICD-10-CM

## 2021-12-01 RX ORDER — ATORVASTATIN CALCIUM 10 MG/1
10 TABLET, FILM COATED ORAL DAILY
Qty: 90 TABLET | Refills: 1 | Status: SHIPPED | OUTPATIENT
Start: 2021-12-01 | End: 2021-12-03 | Stop reason: SDUPTHER

## 2021-12-02 DIAGNOSIS — F39 MOOD DISORDER (HCC): ICD-10-CM

## 2021-12-02 RX ORDER — BUPROPION HYDROCHLORIDE 150 MG/1
TABLET ORAL
Qty: 90 TABLET | Refills: 3 | Status: SHIPPED | OUTPATIENT
Start: 2021-12-02 | End: 2022-06-21 | Stop reason: SDUPTHER

## 2021-12-03 ENCOUNTER — TELEPHONE (OUTPATIENT)
Dept: INTERNAL MEDICINE | Facility: CLINIC | Age: 61
End: 2021-12-03

## 2021-12-03 DIAGNOSIS — E78.49 OTHER HYPERLIPIDEMIA: ICD-10-CM

## 2021-12-03 RX ORDER — ATORVASTATIN CALCIUM 10 MG/1
10 TABLET, FILM COATED ORAL DAILY
Qty: 90 TABLET | Refills: 1 | Status: SHIPPED | OUTPATIENT
Start: 2021-12-03 | End: 2022-06-21 | Stop reason: SDUPTHER

## 2021-12-03 NOTE — TELEPHONE ENCOUNTER
----- Message from Shavonne Dias sent at 12/2/2021  9:42 PM EST -----  Regarding: New cholesterol prescription  Yes thanks

## 2022-03-21 RX ORDER — HYDROCORTISONE 25 MG/G
CREAM TOPICAL
Qty: 30 G | Refills: 1 | Status: SHIPPED | OUTPATIENT
Start: 2022-03-21 | End: 2022-06-21

## 2022-05-12 DIAGNOSIS — E78.49 OTHER HYPERLIPIDEMIA: ICD-10-CM

## 2022-05-12 RX ORDER — ATORVASTATIN CALCIUM 10 MG/1
TABLET, FILM COATED ORAL
Qty: 90 TABLET | Refills: 3 | OUTPATIENT
Start: 2022-05-12

## 2022-06-21 ENCOUNTER — OFFICE VISIT (OUTPATIENT)
Dept: INTERNAL MEDICINE | Facility: CLINIC | Age: 62
End: 2022-06-21

## 2022-06-21 VITALS
TEMPERATURE: 97.7 F | WEIGHT: 146 LBS | DIASTOLIC BLOOD PRESSURE: 70 MMHG | HEART RATE: 92 BPM | HEIGHT: 66 IN | OXYGEN SATURATION: 96 % | BODY MASS INDEX: 23.46 KG/M2 | SYSTOLIC BLOOD PRESSURE: 110 MMHG

## 2022-06-21 DIAGNOSIS — E78.49 OTHER HYPERLIPIDEMIA: ICD-10-CM

## 2022-06-21 DIAGNOSIS — E78.2 MIXED HYPERLIPIDEMIA: ICD-10-CM

## 2022-06-21 DIAGNOSIS — J30.9 ALLERGIC RHINITIS, UNSPECIFIED SEASONALITY, UNSPECIFIED TRIGGER: ICD-10-CM

## 2022-06-21 DIAGNOSIS — F41.9 ANXIETY AND DEPRESSION: Primary | ICD-10-CM

## 2022-06-21 DIAGNOSIS — F39 MOOD DISORDER: ICD-10-CM

## 2022-06-21 DIAGNOSIS — F32.A ANXIETY AND DEPRESSION: Primary | ICD-10-CM

## 2022-06-21 PROBLEM — R89.9 ABNORMAL LABORATORY TEST RESULT: Status: RESOLVED | Noted: 2017-03-08 | Resolved: 2022-06-21

## 2022-06-21 PROCEDURE — 99214 OFFICE O/P EST MOD 30 MIN: CPT | Performed by: FAMILY MEDICINE

## 2022-06-21 RX ORDER — ATORVASTATIN CALCIUM 10 MG/1
10 TABLET, FILM COATED ORAL DAILY
Qty: 90 TABLET | Refills: 1 | Status: SHIPPED | OUTPATIENT
Start: 2022-06-21 | End: 2022-11-30

## 2022-06-21 RX ORDER — BUPROPION HYDROCHLORIDE 150 MG/1
150 TABLET ORAL EVERY MORNING
Qty: 90 TABLET | Refills: 1 | Status: SHIPPED | OUTPATIENT
Start: 2022-06-21 | End: 2023-02-02

## 2022-06-21 NOTE — PROGRESS NOTES
Subjective   Maru Dias is a 61 y.o. female.   Chief Complaint   Patient presents with   • Anxiety   • Depression   • Hyperlipidemia   • Allergic Rhinitis       History of Present Illness     This is a new patient in our office.    1.  Hyperlipidemia-patient was diagnosed in 2020.  She was started on atorvastatin at 10 mg a day about a year ago.  Cholesterol was not tested since then.  She reports no side effects.  No muscle aches, no muscle cramps.  She has no personal history of heart disease.  She is a former smoker.  She smoked for about 7 years 1 to 2 packs/day.  She quit in 1985.  She rides horses once or twice a week, otherwise no regular exercise.    2.  Depression/anxiety- she was diagnosed in 1994.  She was started on Paxil 10 mg and it helped.  She tried to go off 2 times and had to go back on it.  Paxil is prescribed by her GYN.  In 2018 Wellbutrin  mg was added by her PCP.  It helped.  She has no side effects.  No suicidal ideations, no aggressive behaviors.  PHQ-9 is at 1, ALEJANDRO-7 at 1.    3.  Allergic rhinitis- it is seasonal, spring and fall.  Spring is the worst.  She uses Flonase as needed mostly in spring and fall and it is enough to control her symptoms.    History of splenectomy.  It was around 2012.  It was removed during the surgery for pancreatic cyst which was benign.  She had Pneumovax in July 2019.  She reports that she had meningococcal vaccine, but no records on the computer.  She has thrombocytosis after splenectomy.    History of breast cancer- she had right-sided breast cancer diagnosed in 2016.  She had microcalcifications in the left breast.  She was treated with double mastectomy and chemotherapy.  She completed 5 years of treatment with tamoxifen.    She has chronic lower back pain on and off.  X-ray was positive for moderate arthritis.    The following portions of the patient's history were reviewed and updated as appropriate: allergies, current medications, past family  history, past medical history, past social history, past surgical history and problem list.    Review of Systems   Respiratory: Negative.    Cardiovascular: Negative.    Gastrointestinal: Negative for blood in stool.   Psychiatric/Behavioral: Negative.  Negative for suicidal ideas.         Objective   Wt Readings from Last 3 Encounters:   06/21/22 66.2 kg (146 lb)   06/06/22 66.7 kg (147 lb)   05/17/22 66.7 kg (147 lb)      Vitals:    06/21/22 0754   BP: 110/70   Pulse: 92   Temp: 97.7 °F (36.5 °C)   SpO2: 96%     Temp Readings from Last 3 Encounters:   06/21/22 97.7 °F (36.5 °C)   06/06/22 97.9 °F (36.6 °C) (Infrared)   05/17/22 98.2 °F (36.8 °C) (Infrared)     BP Readings from Last 3 Encounters:   06/21/22 110/70   06/06/22 123/76   05/17/22 115/79     Pulse Readings from Last 3 Encounters:   06/21/22 92   06/06/22 60   05/17/22 68     Body mass index is 23.21 kg/m².    Physical Exam  Constitutional:       Appearance: Normal appearance. She is well-developed.   HENT:      Head: Normocephalic and atraumatic.   Neck:      Thyroid: No thyromegaly.      Vascular: No carotid bruit.   Cardiovascular:      Rate and Rhythm: Normal rate and regular rhythm.      Heart sounds: Normal heart sounds.   Pulmonary:      Effort: Pulmonary effort is normal.      Breath sounds: Normal breath sounds.   Musculoskeletal:      Cervical back: Neck supple.   Skin:     General: Skin is warm and dry.   Neurological:      Mental Status: She is alert and oriented to person, place, and time.   Psychiatric:         Behavior: Behavior normal.         Assessment & Plan   Diagnoses and all orders for this visit:    1. Anxiety and depression (Primary)  -     Thyroid Wentworth Profile    2. Mood disorder (HCC)  Comments:  restart addition wellbutrin to paxil - call if problems    3. Other hyperlipidemia    4. Mixed hyperlipidemia  -     CBC (No Diff)  -     Comprehensive Metabolic Panel  -     Lipid Panel With LDL / HDL Ratio  -     Thyroid [place]  Profile    5. Allergic rhinitis, unspecified seasonality, unspecified trigger    Other orders  -     buPROPion XL (WELLBUTRIN XL) 150 MG 24 hr tablet; Take 1 tablet by mouth Every Morning.  Dispense: 90 tablet; Refill: 1  -     atorvastatin (Lipitor) 10 MG tablet; Take 1 tablet by mouth Daily.  Dispense: 90 tablet; Refill: 1        1.  Hyperlipidemia -continue current treatment.  Check labs.  Follow-up in 6 months.  2.  Depression anxiety-continue current treatment.  Follow-up in 6 months, or sooner if problems.  3.  Allergic rhinitis-continue current treatment.  Follow-up in 12 months.

## 2022-06-22 LAB
ALBUMIN SERPL-MCNC: 3.9 G/DL (ref 3.8–4.8)
ALBUMIN/GLOB SERPL: 1.7 {RATIO} (ref 1.2–2.2)
ALP SERPL-CCNC: 82 IU/L (ref 44–121)
ALT SERPL-CCNC: 22 IU/L (ref 0–32)
AST SERPL-CCNC: 32 IU/L (ref 0–40)
BILIRUB SERPL-MCNC: <0.2 MG/DL (ref 0–1.2)
BUN SERPL-MCNC: 17 MG/DL (ref 8–27)
BUN/CREAT SERPL: 22 (ref 12–28)
CALCIUM SERPL-MCNC: 9.3 MG/DL (ref 8.7–10.3)
CHLORIDE SERPL-SCNC: 105 MMOL/L (ref 96–106)
CHOLEST SERPL-MCNC: 163 MG/DL (ref 100–199)
CO2 SERPL-SCNC: 21 MMOL/L (ref 20–29)
CREAT SERPL-MCNC: 0.79 MG/DL (ref 0.57–1)
EGFRCR SERPLBLD CKD-EPI 2021: 85 ML/MIN/1.73
ERYTHROCYTE [DISTWIDTH] IN BLOOD BY AUTOMATED COUNT: 12.9 % (ref 11.7–15.4)
GLOBULIN SER CALC-MCNC: 2.3 G/DL (ref 1.5–4.5)
GLUCOSE SERPL-MCNC: 95 MG/DL (ref 65–99)
HCT VFR BLD AUTO: 41.6 % (ref 34–46.6)
HDLC SERPL-MCNC: 60 MG/DL
HGB BLD-MCNC: 14.1 G/DL (ref 11.1–15.9)
LDLC SERPL CALC-MCNC: 92 MG/DL (ref 0–99)
LDLC/HDLC SERPL: 1.5 RATIO (ref 0–3.2)
MCH RBC QN AUTO: 30.7 PG (ref 26.6–33)
MCHC RBC AUTO-ENTMCNC: 33.9 G/DL (ref 31.5–35.7)
MCV RBC AUTO: 91 FL (ref 79–97)
PLATELET # BLD AUTO: 645 X10E3/UL (ref 150–450)
POTASSIUM SERPL-SCNC: 4.8 MMOL/L (ref 3.5–5.2)
PROT SERPL-MCNC: 6.2 G/DL (ref 6–8.5)
RBC # BLD AUTO: 4.59 X10E6/UL (ref 3.77–5.28)
SODIUM SERPL-SCNC: 142 MMOL/L (ref 134–144)
TRIGL SERPL-MCNC: 53 MG/DL (ref 0–149)
TSH SERPL DL<=0.005 MIU/L-ACNC: 2.01 UIU/ML (ref 0.45–4.5)
VLDLC SERPL CALC-MCNC: 11 MG/DL (ref 5–40)
WBC # BLD AUTO: 8.3 X10E3/UL (ref 3.4–10.8)

## 2022-08-08 DIAGNOSIS — R30.0 DYSURIA: Primary | ICD-10-CM

## 2022-11-30 RX ORDER — ATORVASTATIN CALCIUM 10 MG/1
TABLET, FILM COATED ORAL
Qty: 90 TABLET | Refills: 0 | Status: SHIPPED | OUTPATIENT
Start: 2022-11-30 | End: 2023-02-28

## 2023-02-02 RX ORDER — BUPROPION HYDROCHLORIDE 150 MG/1
TABLET ORAL
Qty: 90 TABLET | Refills: 3 | Status: SHIPPED | OUTPATIENT
Start: 2023-02-02

## 2023-02-28 RX ORDER — ATORVASTATIN CALCIUM 10 MG/1
TABLET, FILM COATED ORAL
Qty: 90 TABLET | Refills: 3 | Status: SHIPPED | OUTPATIENT
Start: 2023-02-28

## 2023-04-05 ENCOUNTER — TELEPHONE (OUTPATIENT)
Dept: URGENT CARE | Facility: CLINIC | Age: 63
End: 2023-04-05
Payer: COMMERCIAL

## 2023-04-05 DIAGNOSIS — N39.0 ACUTE UTI: Primary | ICD-10-CM

## 2023-04-05 RX ORDER — NITROFURANTOIN 25; 75 MG/1; MG/1
100 CAPSULE ORAL 2 TIMES DAILY
Qty: 10 CAPSULE | Refills: 0 | Status: SHIPPED | OUTPATIENT
Start: 2023-04-05 | End: 2023-04-10

## 2023-04-14 DIAGNOSIS — E55.9 VITAMIN D DEFICIENCY: ICD-10-CM

## 2023-04-14 DIAGNOSIS — Z00.00 ANNUAL PHYSICAL EXAM: Primary | ICD-10-CM

## 2023-04-15 LAB
25(OH)D3+25(OH)D2 SERPL-MCNC: 45.1 NG/ML (ref 30–100)
ALBUMIN SERPL-MCNC: 4 G/DL (ref 3.5–5.2)
ALBUMIN/GLOB SERPL: 1.8 G/DL
ALP SERPL-CCNC: 75 U/L (ref 39–117)
ALT SERPL-CCNC: 16 U/L (ref 1–33)
AST SERPL-CCNC: 24 U/L (ref 1–32)
BILIRUB SERPL-MCNC: 0.6 MG/DL (ref 0–1.2)
BUN SERPL-MCNC: 19 MG/DL (ref 8–23)
BUN/CREAT SERPL: 26.8 (ref 7–25)
CALCIUM SERPL-MCNC: 9.6 MG/DL (ref 8.6–10.5)
CHLORIDE SERPL-SCNC: 104 MMOL/L (ref 98–107)
CHOLEST SERPL-MCNC: 194 MG/DL (ref 0–200)
CO2 SERPL-SCNC: 25.1 MMOL/L (ref 22–29)
CREAT SERPL-MCNC: 0.71 MG/DL (ref 0.57–1)
EGFRCR SERPLBLD CKD-EPI 2021: 95.7 ML/MIN/1.73
ERYTHROCYTE [DISTWIDTH] IN BLOOD BY AUTOMATED COUNT: 13.3 % (ref 12.3–15.4)
GLOBULIN SER CALC-MCNC: 2.2 GM/DL
GLUCOSE SERPL-MCNC: 95 MG/DL (ref 65–99)
HCT VFR BLD AUTO: 41.7 % (ref 34–46.6)
HDLC SERPL-MCNC: 62 MG/DL (ref 40–60)
HGB BLD-MCNC: 13.3 G/DL (ref 12–15.9)
LDLC SERPL CALC-MCNC: 115 MG/DL (ref 0–100)
LDLC/HDLC SERPL: 1.83 {RATIO}
MCH RBC QN AUTO: 29.2 PG (ref 26.6–33)
MCHC RBC AUTO-ENTMCNC: 31.9 G/DL (ref 31.5–35.7)
MCV RBC AUTO: 91.4 FL (ref 79–97)
PLATELET # BLD AUTO: 663 10*3/MM3 (ref 140–450)
POTASSIUM SERPL-SCNC: 5 MMOL/L (ref 3.5–5.2)
PROT SERPL-MCNC: 6.2 G/DL (ref 6–8.5)
RBC # BLD AUTO: 4.56 10*6/MM3 (ref 3.77–5.28)
SODIUM SERPL-SCNC: 140 MMOL/L (ref 136–145)
TRIGL SERPL-MCNC: 94 MG/DL (ref 0–150)
TSH SERPL DL<=0.005 MIU/L-ACNC: 2.81 UIU/ML (ref 0.45–4.5)
VLDLC SERPL CALC-MCNC: 17 MG/DL (ref 5–40)
WBC # BLD AUTO: 9.16 10*3/MM3 (ref 3.4–10.8)

## 2023-04-21 ENCOUNTER — OFFICE VISIT (OUTPATIENT)
Dept: INTERNAL MEDICINE | Facility: CLINIC | Age: 63
End: 2023-04-21
Payer: COMMERCIAL

## 2023-04-21 VITALS
BODY MASS INDEX: 24.91 KG/M2 | OXYGEN SATURATION: 97 % | SYSTOLIC BLOOD PRESSURE: 100 MMHG | WEIGHT: 155 LBS | HEART RATE: 73 BPM | DIASTOLIC BLOOD PRESSURE: 60 MMHG | TEMPERATURE: 98 F | HEIGHT: 66 IN

## 2023-04-21 DIAGNOSIS — F32.A ANXIETY AND DEPRESSION: ICD-10-CM

## 2023-04-21 DIAGNOSIS — F41.9 ANXIETY AND DEPRESSION: ICD-10-CM

## 2023-04-21 DIAGNOSIS — E78.49 OTHER HYPERLIPIDEMIA: ICD-10-CM

## 2023-04-21 DIAGNOSIS — M25.551 PAIN OF RIGHT HIP: ICD-10-CM

## 2023-04-21 DIAGNOSIS — Z00.00 ANNUAL PHYSICAL EXAM: Primary | ICD-10-CM

## 2023-04-21 DIAGNOSIS — Z78.0 POSTMENOPAUSAL: ICD-10-CM

## 2023-04-21 RX ORDER — PAROXETINE 10 MG/1
10 TABLET, FILM COATED ORAL DAILY
Qty: 90 TABLET | Refills: 1 | Status: SHIPPED | OUTPATIENT
Start: 2023-04-21

## 2023-04-21 NOTE — PROGRESS NOTES
Subjective   Maru Dias is a 62 y.o. female.   Chief Complaint   Patient presents with   • Depression   • Annual Exam   • Hyperlipidemia   • Hip Pain       History of Present Illness     Patient is here for complete physical exam and to follow-up on depression and hyperlipidemia.    1. CPE-she has no complaints.    She has  recurrent urinary tract infections.  Her GYN ordered urine test today.  Otherwise no change in medical, surgical or family history from last office visit.  No hospitalizations.  No new allergies to medications.  She does not use tobacco products, she drinks on average 1 drink a day, no drugs.  She does not exercise regularly.  She has dental appointments every 6 months.  Vision checked in April 2022.  She saw her GYN today.  She is up-to-date with Pap smear.  She does not get any more mammograms.  She had breast cancer.  She is post double mastectomy.  She had colonoscopy in April 2021.  I reviewed the report.  Next was recommended 5 years later.  She had COVID-vaccine x3, flu vaccine this season.    2.  Hyperlipidemia-on atorvastatin at 10 mg a day, she takes it every day.  She reports no side effects.  No muscle aches, no muscle cramps.  She has no personal history of heart disease.   She is a former smoker.  She smoked for about 7 years 1 to 2 packs/day.  She quit in 1985.     3.  Depression/anxiety- she was diagnosed in 1994.  She was started on Paxil 10 mg and it helped.  She tried to go off 2 times and had to go back on it.  In 2018 Wellbutrin  mg was added.  It helped.  She has no side effects.  No suicidal ideations, no aggressive behaviors. Mood is normal most of the time, no excessive worries. PHQ-9 is at 1, ALEJANDRO-7 at 1.    4.  Hip pain- right hip pain started about a year ago.  There was no known injury.  Pain is on and off, achy, intensity 6-7 out of 10.  It only hurting when she walks.  It resolves when she sits down.  There is no back pain, no numbness, no tingling  associated.    The following portions of the patient's history were reviewed and updated as appropriate: allergies, current medications, past family history, past medical history, past social history, past surgical history and problem list.    Review of Systems   Constitutional: Negative for chills and fever.   Respiratory: Negative for shortness of breath.    Cardiovascular: Negative for chest pain and palpitations.   Gastrointestinal: Negative for blood in stool.   Genitourinary: Negative for hematuria.   Neurological: Negative for light-headedness.   Psychiatric/Behavioral: Negative.  Negative for suicidal ideas.         Objective   Wt Readings from Last 3 Encounters:   04/21/23 70.3 kg (155 lb)   03/27/23 67.1 kg (148 lb)   02/26/23 67.1 kg (148 lb)      Vitals:    04/21/23 1316   BP: 100/60   Pulse: 73   Temp: 98 °F (36.7 °C)   SpO2: 97%     Temp Readings from Last 3 Encounters:   04/21/23 98 °F (36.7 °C)   03/27/23 97.8 °F (36.6 °C) (Infrared)   02/26/23 98.4 °F (36.9 °C) (Infrared)     BP Readings from Last 3 Encounters:   04/21/23 100/60   03/27/23 139/85   02/26/23 112/78     Pulse Readings from Last 3 Encounters:   04/21/23 73   03/27/23 57   02/26/23 68     Body mass index is 24.65 kg/m².    Physical Exam  Vitals reviewed.   Constitutional:       General: She is not in acute distress.     Appearance: She is well-developed. She is not diaphoretic.   HENT:      Head: Normocephalic and atraumatic. Hair is normal.      Right Ear: Hearing, tympanic membrane, ear canal and external ear normal. No decreased hearing noted. No drainage.      Left Ear: Hearing, tympanic membrane, ear canal and external ear normal. No decreased hearing noted.      Nose: No nasal deformity.   Eyes:      General: Lids are normal.         Right eye: No discharge.         Left eye: No discharge.      Conjunctiva/sclera: Conjunctivae normal.      Pupils: Pupils are equal, round, and reactive to light.   Neck:      Thyroid: No  thyromegaly.      Vascular: No JVD.      Trachea: No tracheal deviation.   Cardiovascular:      Rate and Rhythm: Normal rate and regular rhythm.      Pulses: Normal pulses.      Heart sounds: Normal heart sounds. No murmur heard.    No friction rub. No gallop.   Pulmonary:      Effort: Pulmonary effort is normal. No respiratory distress.      Breath sounds: Normal breath sounds. No wheezing or rales.   Chest:      Chest wall: No tenderness.   Abdominal:      General: There is no distension.      Palpations: Abdomen is soft. There is no mass.      Tenderness: There is no abdominal tenderness. There is no guarding or rebound.      Hernia: No hernia is present.   Musculoskeletal:         General: No tenderness or deformity. Normal range of motion.      Cervical back: Normal range of motion and neck supple.   Lymphadenopathy:      Cervical: No cervical adenopathy.   Skin:     General: Skin is warm and dry.      Findings: No erythema or rash.   Neurological:      Mental Status: She is alert and oriented to person, place, and time.      Cranial Nerves: No cranial nerve deficit.      Motor: No abnormal muscle tone.      Coordination: Coordination normal.      Deep Tendon Reflexes: Reflexes are normal and symmetric. Reflexes normal.   Psychiatric:         Behavior: Behavior normal.         Thought Content: Thought content normal.         Judgment: Judgment normal.         Assessment & Plan   Diagnoses and all orders for this visit:    1. Annual physical exam (Primary)    2. Anxiety and depression    3. Pain of right hip  -     XR Hip With or Without Pelvis 2 - 3 View Right    4. Other hyperlipidemia  -     Lipid Panel With LDL / HDL Ratio; Future    5. Postmenopausal  -     DEXA Bone Density Axial; Future    Other orders  -     PARoxetine (PAXIL) 10 MG tablet; Take 1 tablet by mouth Daily.  Dispense: 90 tablet; Refill: 1        1. CPE-preventive counseling provided.  Blood work results reviewed with patient.  We discussed  vaccinations including tetanus vaccine and Shingrix vaccine.  She is up-to-date with cancer screening.  She is advised to limit alcohol to no more than 1 drink a day, start to exercise at least 30 minutes a day, 5 days a week.  Sun protection recommended.  Continue regular dental appointments at least every 6 months.    2.  Hyperlipidemia-a little worse.  No change in medications at this time.  Information on high cholesterol provided.  Follow-up in 6 months.  3.  Depression anxiety-continue current treatment.  Follow-up in 6 months.  4.  Right hip pain-we are checking x-ray.  She can use Tylenol prior to walks as needed.  Follow-up in 6 months, or sooner if problems.          BMI is within normal parameters. No other follow-up for BMI required.

## 2023-06-06 ENCOUNTER — TELEPHONE (OUTPATIENT)
Dept: INTERNAL MEDICINE | Facility: CLINIC | Age: 63
End: 2023-06-06
Payer: COMMERCIAL

## 2023-06-06 NOTE — TELEPHONE ENCOUNTER
Called and spoke with patient regarding hip x-ray order. She said that she is still having pain in her hip. She is going out of town this Saturday but she said that she will get the imaging done when she gets back. Patient aware of where radiology is in our building and that she does not need an appointment.

## 2023-07-14 NOTE — TELEPHONE ENCOUNTER
----- Message from Ruby Florentino sent at 6/21/2019 11:54 AM EDT -----  Contact: Pt   Pt is calling because she has little red dots on her right arm, she had lymph nodes removed.  She has been taking left over,   sulfamethoxazole-trimethoprim (BACTRIM DS) 800-160 MG     She wanted to know if there was anything else she should  Be doing.  Offered acute clinic, she is unable to leave work.     Pt# [phone]   
Detailed message left on v/m informing patient she need to come into the afternoon acute clinic for further evaluation as our providers would not be able to treat her current sx over the phone.     Or with it going into the weekend she can go to an after hours immediate or acute care center for evaluation of her current sx.    Last OV 8/1/18  
Yes needs to be seen in office or go to urgent care. Thanks   
36.8

## 2023-09-15 ENCOUNTER — HOSPITAL ENCOUNTER (OUTPATIENT)
Dept: GENERAL RADIOLOGY | Facility: HOSPITAL | Age: 63
Discharge: HOME | End: 2023-09-15
Payer: COMMERCIAL

## 2023-09-15 ENCOUNTER — HOSPITAL ENCOUNTER (OUTPATIENT)
Dept: BONE DENSITY | Facility: HOSPITAL | Age: 63
Discharge: HOME | End: 2023-09-15
Payer: COMMERCIAL

## 2023-09-15 DIAGNOSIS — Z78.0 POSTMENOPAUSAL: ICD-10-CM

## 2023-09-15 PROCEDURE — 77080 DXA BONE DENSITY AXIAL: CPT

## 2023-09-15 PROCEDURE — 73502 X-RAY EXAM HIP UNI 2-3 VIEWS: CPT

## 2023-10-13 DIAGNOSIS — E78.49 OTHER HYPERLIPIDEMIA: ICD-10-CM

## 2023-10-17 RX ORDER — ATORVASTATIN CALCIUM 10 MG/1
10 TABLET, FILM COATED ORAL DAILY
Qty: 90 TABLET | Refills: 0 | Status: SHIPPED | OUTPATIENT
Start: 2023-10-17

## 2023-10-17 RX ORDER — BUPROPION HYDROCHLORIDE 150 MG/1
150 TABLET ORAL EVERY MORNING
Qty: 90 TABLET | Refills: 0 | Status: SHIPPED | OUTPATIENT
Start: 2023-10-17

## 2023-10-17 RX ORDER — PAROXETINE 10 MG/1
10 TABLET, FILM COATED ORAL DAILY
Qty: 90 TABLET | Refills: 0 | Status: SHIPPED | OUTPATIENT
Start: 2023-10-17

## 2023-10-18 LAB
CHOLEST SERPL-MCNC: 198 MG/DL (ref 100–199)
HDLC SERPL-MCNC: 60 MG/DL
LDLC SERPL CALC-MCNC: 122 MG/DL (ref 0–99)
LDLC/HDLC SERPL: 2 RATIO (ref 0–3.2)
TRIGL SERPL-MCNC: 88 MG/DL (ref 0–149)
VLDLC SERPL CALC-MCNC: 16 MG/DL (ref 5–40)

## 2023-10-25 ENCOUNTER — OFFICE VISIT (OUTPATIENT)
Dept: INTERNAL MEDICINE | Facility: CLINIC | Age: 63
End: 2023-10-25
Payer: COMMERCIAL

## 2023-10-25 VITALS
OXYGEN SATURATION: 96 % | SYSTOLIC BLOOD PRESSURE: 100 MMHG | HEART RATE: 78 BPM | HEIGHT: 66 IN | BODY MASS INDEX: 24.75 KG/M2 | DIASTOLIC BLOOD PRESSURE: 60 MMHG | TEMPERATURE: 98.6 F | WEIGHT: 154 LBS

## 2023-10-25 DIAGNOSIS — F32.A ANXIETY AND DEPRESSION: Primary | ICD-10-CM

## 2023-10-25 DIAGNOSIS — E78.2 MIXED HYPERLIPIDEMIA: ICD-10-CM

## 2023-10-25 DIAGNOSIS — F41.9 ANXIETY AND DEPRESSION: Primary | ICD-10-CM

## 2023-10-25 DIAGNOSIS — J30.9 ALLERGIC RHINITIS, UNSPECIFIED SEASONALITY, UNSPECIFIED TRIGGER: ICD-10-CM

## 2023-10-25 PROCEDURE — 99214 OFFICE O/P EST MOD 30 MIN: CPT | Performed by: FAMILY MEDICINE

## 2023-10-25 RX ORDER — BUPROPION HYDROCHLORIDE 150 MG/1
150 TABLET ORAL EVERY MORNING
Qty: 90 TABLET | Refills: 1 | Status: SHIPPED | OUTPATIENT
Start: 2023-10-25

## 2023-10-25 RX ORDER — ATORVASTATIN CALCIUM 10 MG/1
10 TABLET, FILM COATED ORAL DAILY
Qty: 90 TABLET | Refills: 1 | Status: SHIPPED | OUTPATIENT
Start: 2023-10-25

## 2023-10-25 RX ORDER — PAROXETINE 10 MG/1
10 TABLET, FILM COATED ORAL DAILY
Qty: 90 TABLET | Refills: 1 | Status: SHIPPED | OUTPATIENT
Start: 2023-10-25

## 2023-10-25 NOTE — PROGRESS NOTES
Subjective   Maru Dias is a 63 y.o. female.   Chief Complaint   Patient presents with    Depression    Hyperlipidemia    Postnasal drainage       DepressionPatient is not experiencing: shortness of breath.      Hyperlipidemia  Pertinent negatives include no shortness of breath.        Hyperlipidemia-on atorvastatin at 10 mg a day, she takes it every day.  She reports no side effects. She has no personal history of heart disease.   She is a former smoker.  She smoked for about 7 years 1 to 2 packs/day.  She quit in 1985.  She did labs just after coming back from vacation.  She had more seafood there.  LDL on 122 from 115.  HDL 60.     Depression/anxiety- she was diagnosed in 1994.  She was started on Paxil 10 mg and it helped.  She tried to go off 2 times and had to go back on it.  In 2018 Wellbutrin  mg was added.  It helped.  She takes it every day.  She has no side effects.  No suicidal ideations, no aggressive behaviors. Mood is normal most of the time, no excessive worries. PHQ-9 is at 0, ALEJANDRO-7 at 0.     Hip pain-at last office visit we ordered an x-ray.  It was positive for arthritis.  We recommended Tylenol.  Patient reports that symptoms are significantly better.    Postnasal drainage, tickle in the throat from the drainage and occasional cough.  Started 2 days ago.  Drainage is clear.  She is off of Flonase.    Review of Systems   Constitutional:  Negative for chills and fever.   HENT:  Positive for postnasal drip. Negative for sore throat.    Respiratory:  Positive for cough. Negative for shortness of breath and wheezing.    Cardiovascular: Negative.          Objective   Wt Readings from Last 3 Encounters:   10/25/23 69.9 kg (154 lb)   04/21/23 70.3 kg (155 lb)   03/27/23 67.1 kg (148 lb)      Vitals:    10/25/23 0929   BP: 100/60   Pulse: 78   Temp: 98.6 °F (37 °C)   SpO2: 96%     Temp Readings from Last 3 Encounters:   10/25/23 98.6 °F (37 °C)   04/21/23 98 °F (36.7 °C)   03/27/23 97.8 °F (36.6  °C) (Infrared)     BP Readings from Last 3 Encounters:   10/25/23 100/60   04/21/23 100/60   03/27/23 139/85     Pulse Readings from Last 3 Encounters:   10/25/23 78   04/21/23 73   03/27/23 57     Body mass index is 24.49 kg/m².    Physical Exam  Constitutional:       Appearance: She is well-developed.   HENT:      Mouth/Throat:      Pharynx: No pharyngeal swelling, oropharyngeal exudate or posterior oropharyngeal erythema.   Neck:      Thyroid: No thyromegaly.      Vascular: No carotid bruit.   Cardiovascular:      Rate and Rhythm: Normal rate and regular rhythm.      Heart sounds: Normal heart sounds.   Pulmonary:      Effort: Pulmonary effort is normal. No respiratory distress.      Breath sounds: Normal breath sounds. No wheezing or rhonchi.   Skin:     General: Skin is warm and dry.   Neurological:      Mental Status: She is alert.   Psychiatric:         Behavior: Behavior normal.         Assessment & Plan   Diagnoses and all orders for this visit:    1. Anxiety and depression (Primary)    2. Mixed hyperlipidemia    3. Allergic rhinitis, unspecified seasonality, unspecified trigger    Other orders  -     atorvastatin (LIPITOR) 10 MG tablet; Take 1 tablet by mouth Daily.  Dispense: 90 tablet; Refill: 1  -     PARoxetine (PAXIL) 10 MG tablet; Take 1 tablet by mouth Daily.  Dispense: 90 tablet; Refill: 1  -     buPROPion XL (WELLBUTRIN XL) 150 MG 24 hr tablet; Take 1 tablet by mouth Every Morning.  Dispense: 90 tablet; Refill: 1        Depression anxiety-continue current treatment.  Follow-up in 6 months.    Hyperlipidemia-continue current treatment.  Follow-up in 6 months.    Allergic rhinitis-start Flonase 2 sprays to each nostril every morning, Zyrtec or Claritin at bedtime.  If symptoms are not better with treatment or if you get worse return to the office.    Hip pain - follow-up as needed.          BMI is within normal parameters. No other follow-up for BMI required.

## 2024-01-05 ENCOUNTER — OFFICE VISIT (OUTPATIENT)
Dept: INTERNAL MEDICINE | Facility: CLINIC | Age: 64
End: 2024-01-05
Payer: COMMERCIAL

## 2024-01-05 VITALS
DIASTOLIC BLOOD PRESSURE: 80 MMHG | HEIGHT: 66 IN | OXYGEN SATURATION: 96 % | WEIGHT: 154 LBS | HEART RATE: 78 BPM | BODY MASS INDEX: 24.75 KG/M2 | TEMPERATURE: 97.5 F | SYSTOLIC BLOOD PRESSURE: 126 MMHG

## 2024-01-05 DIAGNOSIS — Q89.01 ASPLENIA: ICD-10-CM

## 2024-01-05 DIAGNOSIS — J01.90 ACUTE NON-RECURRENT SINUSITIS, UNSPECIFIED LOCATION: Primary | ICD-10-CM

## 2024-01-05 PROCEDURE — 99212 OFFICE O/P EST SF 10 MIN: CPT | Performed by: FAMILY MEDICINE

## 2024-01-05 NOTE — PROGRESS NOTES
Subjective   Maru Dias is a 63 y.o. female.   Chief Complaint   Patient presents with    Adenopathy       History of Present Illness     Sinus infection-patient was evaluated in urgent care on 12/22/2023.  She had congestion, drainage and a cough.  No sore throat.  She had fever of 100.5 and headache.  She had enlarged cervical lymph node on the left.  It was tender.  She tested at home for COVID and it was negative.  She was not tested for anything in urgent care.  She was treated with IM ceftriaxone and prescribed Augmentin because she is asplenic.  She reports improvement of symptoms in 4 to 5 days.  She has no more cough, no drainage or congestion.  Lymph node enlargement resolved.  She cannot palpate it anymore.    Review of Systems   Constitutional:  Negative for chills and fever.   HENT:  Negative for congestion, sinus pain and sore throat.    Respiratory:  Negative for cough and shortness of breath.    Cardiovascular:  Negative for chest pain and palpitations.         Objective   Wt Readings from Last 3 Encounters:   01/05/24 69.9 kg (154 lb)   12/22/23 70.8 kg (156 lb)   10/25/23 69.9 kg (154 lb)      Vitals:    01/05/24 0728   BP: 126/80   Pulse: 78   Temp: 97.5 °F (36.4 °C)   SpO2: 96%     Temp Readings from Last 3 Encounters:   01/05/24 97.5 °F (36.4 °C)   12/22/23 98.5 °F (36.9 °C) (Temporal)   10/25/23 98.6 °F (37 °C)     BP Readings from Last 3 Encounters:   01/05/24 126/80   12/22/23 126/82   10/25/23 100/60     Pulse Readings from Last 3 Encounters:   01/05/24 78   12/22/23 75   10/25/23 78     Body mass index is 24.49 kg/m².    Physical Exam  Constitutional:       Appearance: She is well-developed.   Neck:      Thyroid: No thyromegaly.      Vascular: No carotid bruit.   Cardiovascular:      Rate and Rhythm: Normal rate and regular rhythm.      Heart sounds: Normal heart sounds.   Pulmonary:      Effort: Pulmonary effort is normal.      Breath sounds: Normal breath sounds.   Lymphadenopathy:       Head:      Right side of head: No submandibular adenopathy.      Left side of head: No submandibular adenopathy.      Cervical: No cervical adenopathy.      Right cervical: No superficial, deep or posterior cervical adenopathy.     Left cervical: No superficial, deep or posterior cervical adenopathy.      Upper Body:      Right upper body: No supraclavicular adenopathy.      Left upper body: No supraclavicular adenopathy.   Neurological:      Mental Status: She is alert.         Assessment & Plan   Diagnoses and all orders for this visit:    1. Acute non-recurrent sinusitis, unspecified location (Primary)    2. Asplenia        Sinus infection/asplenia-urgent care reports reviewed.  Symptoms resolved.  Patient will continue Flonase for allergic rhinitis.  Follow-up as needed.

## 2024-01-15 RX ORDER — PAROXETINE 10 MG/1
10 TABLET, FILM COATED ORAL DAILY
Qty: 90 TABLET | Refills: 3 | Status: SHIPPED | OUTPATIENT
Start: 2024-01-15

## 2024-01-15 RX ORDER — BUPROPION HYDROCHLORIDE 150 MG/1
150 TABLET ORAL EVERY MORNING
Qty: 90 TABLET | Refills: 1 | Status: SHIPPED | OUTPATIENT
Start: 2024-01-15

## 2024-05-03 DIAGNOSIS — E78.2 MIXED HYPERLIPIDEMIA: Primary | ICD-10-CM

## 2024-05-03 DIAGNOSIS — Z00.00 ANNUAL PHYSICAL EXAM: ICD-10-CM

## 2024-05-04 LAB
ALBUMIN SERPL-MCNC: 4.2 G/DL (ref 3.5–5.2)
ALBUMIN/GLOB SERPL: 2 G/DL
ALP SERPL-CCNC: 78 U/L (ref 39–117)
ALT SERPL-CCNC: 15 U/L (ref 1–33)
AST SERPL-CCNC: 20 U/L (ref 1–32)
BILIRUB SERPL-MCNC: 0.5 MG/DL (ref 0–1.2)
BUN SERPL-MCNC: 13 MG/DL (ref 8–23)
BUN/CREAT SERPL: 18.6 (ref 7–25)
CALCIUM SERPL-MCNC: 9.5 MG/DL (ref 8.6–10.5)
CHLORIDE SERPL-SCNC: 103 MMOL/L (ref 98–107)
CHOLEST SERPL-MCNC: 184 MG/DL (ref 0–200)
CO2 SERPL-SCNC: 25.2 MMOL/L (ref 22–29)
CREAT SERPL-MCNC: 0.7 MG/DL (ref 0.57–1)
EGFRCR SERPLBLD CKD-EPI 2021: 96.7 ML/MIN/1.73
ERYTHROCYTE [DISTWIDTH] IN BLOOD BY AUTOMATED COUNT: 12.9 % (ref 12.3–15.4)
GLOBULIN SER CALC-MCNC: 2.1 GM/DL
GLUCOSE SERPL-MCNC: 105 MG/DL (ref 65–99)
HCT VFR BLD AUTO: 43.8 % (ref 34–46.6)
HDLC SERPL-MCNC: 58 MG/DL (ref 40–60)
HGB BLD-MCNC: 14.3 G/DL (ref 12–15.9)
LDLC SERPL CALC-MCNC: 113 MG/DL (ref 0–100)
LDLC/HDLC SERPL: 1.93 {RATIO}
MCH RBC QN AUTO: 29.1 PG (ref 26.6–33)
MCHC RBC AUTO-ENTMCNC: 32.6 G/DL (ref 31.5–35.7)
MCV RBC AUTO: 89 FL (ref 79–97)
PLATELET # BLD AUTO: 691 10*3/MM3 (ref 140–450)
POTASSIUM SERPL-SCNC: 4.8 MMOL/L (ref 3.5–5.2)
PROT SERPL-MCNC: 6.3 G/DL (ref 6–8.5)
RBC # BLD AUTO: 4.92 10*6/MM3 (ref 3.77–5.28)
SODIUM SERPL-SCNC: 140 MMOL/L (ref 136–145)
TRIGL SERPL-MCNC: 70 MG/DL (ref 0–150)
TSH SERPL DL<=0.005 MIU/L-ACNC: 2.22 UIU/ML (ref 0.45–4.5)
VLDLC SERPL CALC-MCNC: 13 MG/DL (ref 5–40)
WBC # BLD AUTO: 7.8 10*3/MM3 (ref 3.4–10.8)

## 2024-05-06 ENCOUNTER — OFFICE VISIT (OUTPATIENT)
Dept: INTERNAL MEDICINE | Facility: CLINIC | Age: 64
End: 2024-05-06
Payer: COMMERCIAL

## 2024-05-06 VITALS
OXYGEN SATURATION: 99 % | HEIGHT: 66 IN | SYSTOLIC BLOOD PRESSURE: 112 MMHG | TEMPERATURE: 97.3 F | DIASTOLIC BLOOD PRESSURE: 70 MMHG | BODY MASS INDEX: 24.75 KG/M2 | HEART RATE: 70 BPM | WEIGHT: 154 LBS

## 2024-05-06 DIAGNOSIS — F41.9 ANXIETY AND DEPRESSION: ICD-10-CM

## 2024-05-06 DIAGNOSIS — E78.2 MIXED HYPERLIPIDEMIA: ICD-10-CM

## 2024-05-06 DIAGNOSIS — Z00.00 ANNUAL PHYSICAL EXAM: Primary | ICD-10-CM

## 2024-05-06 DIAGNOSIS — F32.A ANXIETY AND DEPRESSION: ICD-10-CM

## 2024-05-06 DIAGNOSIS — R73.9 HYPERGLYCEMIA: ICD-10-CM

## 2024-05-06 PROCEDURE — 99396 PREV VISIT EST AGE 40-64: CPT | Performed by: FAMILY MEDICINE

## 2024-09-13 RX ORDER — PAROXETINE 10 MG/1
10 TABLET, FILM COATED ORAL DAILY
Qty: 90 TABLET | Refills: 3 | Status: SHIPPED | OUTPATIENT
Start: 2024-09-13

## 2024-09-13 RX ORDER — BUPROPION HYDROCHLORIDE 150 MG/1
150 TABLET ORAL EVERY MORNING
Qty: 90 TABLET | Refills: 1 | Status: SHIPPED | OUTPATIENT
Start: 2024-09-13

## 2024-09-13 RX ORDER — ATORVASTATIN CALCIUM 10 MG/1
10 TABLET, FILM COATED ORAL DAILY
Qty: 90 TABLET | Refills: 1 | Status: SHIPPED | OUTPATIENT
Start: 2024-09-13

## 2024-10-29 DIAGNOSIS — E78.2 MIXED HYPERLIPIDEMIA: Primary | ICD-10-CM

## 2024-10-29 DIAGNOSIS — R73.9 HYPERGLYCEMIA: ICD-10-CM

## 2024-11-05 ENCOUNTER — TELEPHONE (OUTPATIENT)
Dept: GASTROENTEROLOGY | Facility: CLINIC | Age: 64
End: 2024-11-05
Payer: MEDICARE

## 2024-11-05 NOTE — TELEPHONE ENCOUNTER
My chart message  Hi when am I due for my annual colonoscopy checkup?     Thank you  Shavonne Dias     I also have Medicare now.

## 2024-11-06 ENCOUNTER — HOSPITAL ENCOUNTER (OUTPATIENT)
Dept: GENERAL RADIOLOGY | Facility: HOSPITAL | Age: 64
Discharge: HOME | End: 2024-11-06
Admitting: FAMILY MEDICINE
Payer: MEDICARE

## 2024-11-06 ENCOUNTER — OFFICE VISIT (OUTPATIENT)
Dept: INTERNAL MEDICINE | Facility: CLINIC | Age: 64
End: 2024-11-06
Payer: MEDICARE

## 2024-11-06 VITALS
TEMPERATURE: 97.7 F | SYSTOLIC BLOOD PRESSURE: 120 MMHG | BODY MASS INDEX: 25.07 KG/M2 | HEART RATE: 78 BPM | DIASTOLIC BLOOD PRESSURE: 72 MMHG | WEIGHT: 156 LBS | HEIGHT: 66 IN | OXYGEN SATURATION: 96 %

## 2024-11-06 DIAGNOSIS — E78.2 MIXED HYPERLIPIDEMIA: Primary | ICD-10-CM

## 2024-11-06 DIAGNOSIS — F41.9 ANXIETY AND DEPRESSION: ICD-10-CM

## 2024-11-06 DIAGNOSIS — F32.A ANXIETY AND DEPRESSION: ICD-10-CM

## 2024-11-06 DIAGNOSIS — H93.13 TINNITUS OF BOTH EARS: ICD-10-CM

## 2024-11-06 DIAGNOSIS — R05.3 CHRONIC COUGH: ICD-10-CM

## 2024-11-06 DIAGNOSIS — J30.9 ALLERGIC RHINITIS, UNSPECIFIED SEASONALITY, UNSPECIFIED TRIGGER: ICD-10-CM

## 2024-11-06 PROBLEM — R73.9 HYPERGLYCEMIA: Status: ACTIVE | Noted: 2024-11-06

## 2024-11-06 PROCEDURE — 1126F AMNT PAIN NOTED NONE PRSNT: CPT | Performed by: FAMILY MEDICINE

## 2024-11-06 PROCEDURE — G2211 COMPLEX E/M VISIT ADD ON: HCPCS | Performed by: FAMILY MEDICINE

## 2024-11-06 PROCEDURE — 99214 OFFICE O/P EST MOD 30 MIN: CPT | Performed by: FAMILY MEDICINE

## 2024-11-06 PROCEDURE — 71046 X-RAY EXAM CHEST 2 VIEWS: CPT

## 2024-11-06 NOTE — PROGRESS NOTES
Subjective   Shavonne Dias is a 64 y.o. female.   Chief Complaint   Patient presents with    Cough    Anxiety    Hyperlipidemia    Ringing sensation       History of Present Illness     Hyperlipidemia-on atorvastatin at 10 mg a day, she takes it every day.  No side effects.  No muscle aches, no muscle cramps.  She has no personal history of heart disease.  No labs done prior to office visit.  She is a former smoker.  She smoked for about 7 years 1 to 2 packs/day.  She quit in 1985.     Depression/anxiety- she was diagnosed in 1994.  She was started on Paxil 10 mg and it helped.  She tried to go off 2 times and had to go back on it.  In 2018 Wellbutrin  mg was added.  It helped.  She continues to takes Paxil and Wellbutrin.  She takes it every day as prescribed.  She has no side effects.  No suicidal ideations, no aggressive behaviors. Mood is normal most of the time, no excessive worries.  She retired in September.  She feels great.  She spends more time with her grandchildren.  PHQ-9 at 1, ALEJANDRO-7 at 0.    Ringing sensation in ears-patient has it for years.  She has decreased hearing.  She would like to see an ENT because sometimes she gets ringing sensation during the day and it is louder than in the evenings. It is sometimes right ear, sometimes in the left ear.  No pain.     Cough- she has daily cough.  She noticed it in the spring 2024.  It is a dry cough, occasionally productive.  She has no coughing spells.  No shortness of breath or wheezing.  She has postnasal drainage, sometimes sinus headache, she sneezes couple times a day.  Cough is worse when she works with horses or working in the yard.  She has no reflux.  No nausea, no burning sensation in esophagus, no abdominal pain.    Review of Systems   Constitutional:  Negative for chills and fever.   HENT:  Positive for postnasal drip, rhinorrhea and sneezing. Negative for congestion.    Respiratory:  Positive for cough. Negative for shortness of breath  and wheezing.    Cardiovascular: Negative.    Gastrointestinal:  Negative for abdominal pain and nausea.   Musculoskeletal:  Negative for myalgias.         Objective   Wt Readings from Last 3 Encounters:   11/06/24 70.8 kg (156 lb)   08/01/24 69.9 kg (154 lb)   05/06/24 69.9 kg (154 lb)      Vitals:    11/06/24 0943   BP: 120/72   Pulse: 78   Temp: 97.7 °F (36.5 °C)   SpO2: 96%     Temp Readings from Last 3 Encounters:   11/06/24 97.7 °F (36.5 °C)   08/01/24 98 °F (36.7 °C) (Infrared)   05/06/24 97.3 °F (36.3 °C)     BP Readings from Last 3 Encounters:   11/06/24 120/72   08/01/24 128/75   05/06/24 112/70     Pulse Readings from Last 3 Encounters:   11/06/24 78   08/01/24 70   05/06/24 70     Body mass index is 25.56 kg/m².    Physical Exam  Constitutional:       Appearance: She is well-developed.   HENT:      Right Ear: Tympanic membrane normal.      Left Ear: Tympanic membrane normal.      Ears:      Comments: Small amount of cerumen bilaterally.     Mouth/Throat:      Pharynx: Postnasal drip present. No oropharyngeal exudate or posterior oropharyngeal erythema.   Neck:      Vascular: No carotid bruit.   Cardiovascular:      Rate and Rhythm: Normal rate and regular rhythm.      Heart sounds: Normal heart sounds.   Pulmonary:      Effort: Pulmonary effort is normal.      Breath sounds: Normal breath sounds. No wheezing, rhonchi or rales.   Skin:     General: Skin is warm and dry.   Neurological:      Mental Status: She is alert.   Psychiatric:         Behavior: Behavior normal.         Assessment & Plan   Diagnoses and all orders for this visit:    1. Mixed hyperlipidemia (Primary)    2. Anxiety and depression    3. Tinnitus of both ears  -     Ambulatory Referral to ENT (Otolaryngology)    4. Chronic cough  -     XR Chest PA & Lateral    5. Allergic rhinitis, unspecified seasonality, unspecified trigger        Hyperlipidemia-continue current treatment.  Check labs.  Follow-up in 6  months.    Anxiety/depression-continue current treatment.  Follow-up in 6 months.    Tinnitus-likely due to decreased hearing.  Discussed with patient hearing testing with audiometry.  She prefers to be referred to ENT.    Cough-likely secondary to postnasal drainage/allergies-we are starting Nasacort 2 sprays to each nostril every morning.  If in 1 month she still has cough she will add azelastine 2 sprays in p.m.  We are checking chest x-ray to be complete.  Follow-up in 2 months.

## 2024-11-12 LAB
ALBUMIN SERPL-MCNC: 4 G/DL (ref 3.5–5.2)
ALBUMIN/GLOB SERPL: 1.6 G/DL
ALP SERPL-CCNC: 89 U/L (ref 39–117)
ALT SERPL-CCNC: 12 U/L (ref 1–33)
AST SERPL-CCNC: 19 U/L (ref 1–32)
BILIRUB SERPL-MCNC: 0.3 MG/DL (ref 0–1.2)
BUN SERPL-MCNC: 19 MG/DL (ref 8–23)
BUN/CREAT SERPL: 23.2 (ref 7–25)
CALCIUM SERPL-MCNC: 9.7 MG/DL (ref 8.6–10.5)
CHLORIDE SERPL-SCNC: 103 MMOL/L (ref 98–107)
CHOLEST SERPL-MCNC: 195 MG/DL (ref 0–200)
CO2 SERPL-SCNC: 23.6 MMOL/L (ref 22–29)
CREAT SERPL-MCNC: 0.82 MG/DL (ref 0.57–1)
EGFRCR SERPLBLD CKD-EPI 2021: 79.5 ML/MIN/1.73
GLOBULIN SER CALC-MCNC: 2.5 GM/DL
GLUCOSE SERPL-MCNC: 105 MG/DL (ref 65–99)
HDLC SERPL-MCNC: 59 MG/DL (ref 40–60)
LDLC SERPL CALC-MCNC: 123 MG/DL (ref 0–100)
LDLC/HDLC SERPL: 2.06 {RATIO}
POTASSIUM SERPL-SCNC: 4.9 MMOL/L (ref 3.5–5.2)
PROT SERPL-MCNC: 6.5 G/DL (ref 6–8.5)
SODIUM SERPL-SCNC: 136 MMOL/L (ref 136–145)
TRIGL SERPL-MCNC: 71 MG/DL (ref 0–150)
VLDLC SERPL CALC-MCNC: 13 MG/DL (ref 5–40)

## 2025-04-07 ENCOUNTER — OFFICE VISIT (OUTPATIENT)
Dept: INTERNAL MEDICINE | Facility: CLINIC | Age: 65
End: 2025-04-07
Payer: MEDICARE

## 2025-04-07 VITALS
WEIGHT: 153 LBS | SYSTOLIC BLOOD PRESSURE: 126 MMHG | HEIGHT: 66 IN | TEMPERATURE: 97.7 F | OXYGEN SATURATION: 96 % | DIASTOLIC BLOOD PRESSURE: 80 MMHG | BODY MASS INDEX: 24.59 KG/M2 | HEART RATE: 74 BPM

## 2025-04-07 DIAGNOSIS — F41.9 ANXIETY AND DEPRESSION: ICD-10-CM

## 2025-04-07 DIAGNOSIS — E78.2 MIXED HYPERLIPIDEMIA: Primary | ICD-10-CM

## 2025-04-07 DIAGNOSIS — J30.9 ALLERGIC RHINITIS, UNSPECIFIED SEASONALITY, UNSPECIFIED TRIGGER: ICD-10-CM

## 2025-04-07 DIAGNOSIS — R73.01 IFG (IMPAIRED FASTING GLUCOSE): ICD-10-CM

## 2025-04-07 DIAGNOSIS — F32.A ANXIETY AND DEPRESSION: ICD-10-CM

## 2025-04-07 RX ORDER — BUPROPION HYDROCHLORIDE 150 MG/1
150 TABLET ORAL EVERY MORNING
Qty: 90 TABLET | Refills: 1 | Status: SHIPPED | OUTPATIENT
Start: 2025-04-07

## 2025-04-07 RX ORDER — ATORVASTATIN CALCIUM 10 MG/1
10 TABLET, FILM COATED ORAL DAILY
Qty: 90 TABLET | Refills: 1 | Status: SHIPPED | OUTPATIENT
Start: 2025-04-07

## 2025-04-07 NOTE — PROGRESS NOTES
Subjective   Shavonne Dias is a 64 y.o. female.   Chief Complaint   Patient presents with    Depression    Hyperlipidemia    Allergic Rhinitis       History of Present Illness     Hyperlipidemia-on atorvastatin at 10 mg a day, she takes it every day.  No side effects.  No muscle aches, no muscle cramps.  She has no personal history of heart disease.  No labs done prior to office visit.  She is a former smoker.  She smoked for about 7 years 1 to 2 packs/day.  She quit in 1985.     Depression/anxiety- she was diagnosed in 1994.  She was started on Paxil 10 mg and it helped.  She tried to go off 2 times and had to go back on it.  In 2018 Wellbutrin  mg was added.  It helped.  She continues to takes Paxil and Wellbutrin.  She takes it every day as prescribed.  She has no side effects.  No suicidal ideations, no aggressive behaviors. Mood is normal most of the time, no excessive worries.  She continues to enjoy her residential PHQ-9 at 0, ALEJANDRO-7 at 0.    Hyperglycemia - fasting blood sugar at last blood work was 105 from 105.  BMI 24.3.    Allergic rhinitis-at last office visit we recommended Nasacort.  It helped.  In the last few days with the weather change, she had more nasal drainage and sinus pressure.    Review of Systems   HENT:  Positive for sinus pressure.    Respiratory:  Negative for shortness of breath.    Cardiovascular:  Negative for chest pain and palpitations.   Musculoskeletal:  Negative for myalgias.   Neurological:  Negative for light-headedness.         Objective   Wt Readings from Last 3 Encounters:   04/07/25 69.4 kg (153 lb)   11/17/24 70.8 kg (156 lb)   11/06/24 70.8 kg (156 lb)      Vitals:    04/07/25 1259   BP: 126/80   Pulse: 74   Temp: 97.7 °F (36.5 °C)   SpO2: 96%     Temp Readings from Last 3 Encounters:   04/07/25 97.7 °F (36.5 °C)   11/17/24 99.3 °F (37.4 °C) (Temporal)   11/06/24 97.7 °F (36.5 °C)     BP Readings from Last 3 Encounters:   04/07/25 126/80   11/17/24 122/74    11/06/24 120/72     Pulse Readings from Last 3 Encounters:   04/07/25 74   11/17/24 66   11/06/24 78     Body mass index is 24.33 kg/m².    Physical Exam  Constitutional:       Appearance: She is well-developed.   Neck:      Vascular: No carotid bruit.   Cardiovascular:      Rate and Rhythm: Normal rate and regular rhythm.      Heart sounds: Normal heart sounds.   Pulmonary:      Effort: Pulmonary effort is normal.      Breath sounds: Normal breath sounds.   Skin:     General: Skin is warm and dry.   Neurological:      Mental Status: She is alert.   Psychiatric:         Behavior: Behavior normal.         Assessment & Plan   Diagnoses and all orders for this visit:    1. Mixed hyperlipidemia (Primary)  -     Comprehensive Metabolic Panel; Future  -     Lipid Panel With LDL / HDL Ratio; Future    2. Anxiety and depression    3. Allergic rhinitis, unspecified seasonality, unspecified trigger    4. IFG (impaired fasting glucose)  -     Comprehensive Metabolic Panel; Future  -     Hemoglobin A1c; Future    Other orders  -     buPROPion XL (WELLBUTRIN XL) 150 MG 24 hr tablet; Take 1 tablet by mouth Every Morning.  Dispense: 90 tablet; Refill: 1  -     atorvastatin (LIPITOR) 10 MG tablet; Take 1 tablet by mouth Daily.  Dispense: 90 tablet; Refill: 1        HLP-Continue current treatment.  Follow-up in 6 months.    Depression/anxiety -continue current treatment.  Follow-up in 6 months.    Allergic rhinitis-continue Nasacort.  Add azelastine as needed.  Follow-up in 6 months, sooner if any problems.    IFG-check A1c.  Follow-up in 6 months.          BMI is within normal parameters. No other follow-up for BMI required.

## 2025-05-05 DIAGNOSIS — R35.0 URINARY FREQUENCY: Primary | ICD-10-CM

## 2025-05-07 LAB
APPEARANCE UR: CLEAR
BACTERIA #/AREA URNS HPF: NORMAL /[HPF]
BACTERIA UR CULT: NORMAL
BACTERIA UR CULT: NORMAL
BILIRUB UR QL STRIP: NEGATIVE
CASTS URNS QL MICRO: NORMAL /LPF
COLOR UR: YELLOW
EPI CELLS #/AREA URNS HPF: NORMAL /HPF (ref 0–10)
GLUCOSE UR QL STRIP: NEGATIVE
HGB UR QL STRIP: NEGATIVE
KETONES UR QL STRIP: NEGATIVE
LEUKOCYTE ESTERASE UR QL STRIP: ABNORMAL
MICRO URNS: ABNORMAL
NITRITE UR QL STRIP: NEGATIVE
PH UR STRIP: 6 [PH] (ref 5–7.5)
PROT UR QL STRIP: NEGATIVE
RBC #/AREA URNS HPF: NORMAL /HPF (ref 0–2)
SP GR UR STRIP: 1.01 (ref 1–1.03)
URINALYSIS REFLEX: ABNORMAL
UROBILINOGEN UR STRIP-MCNC: 0.2 MG/DL (ref 0.2–1)
WBC #/AREA URNS HPF: NORMAL /HPF (ref 0–5)

## 2025-05-08 ENCOUNTER — OFFICE VISIT (OUTPATIENT)
Dept: INTERNAL MEDICINE | Facility: CLINIC | Age: 65
End: 2025-05-08
Payer: MEDICARE

## 2025-05-08 VITALS
HEIGHT: 66 IN | WEIGHT: 149.9 LBS | HEART RATE: 85 BPM | OXYGEN SATURATION: 100 % | SYSTOLIC BLOOD PRESSURE: 116 MMHG | BODY MASS INDEX: 24.09 KG/M2 | DIASTOLIC BLOOD PRESSURE: 68 MMHG | TEMPERATURE: 97.7 F

## 2025-05-08 DIAGNOSIS — R39.89 SENSATION OF PRESSURE IN BLADDER AREA: Primary | ICD-10-CM
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
## 2025-05-08 DIAGNOSIS — S00.06XA TICK BITE OF SCALP, INITIAL ENCOUNTER: ICD-10-CM

## 2025-05-08 NOTE — PROGRESS NOTES
Subjective   Shavonne Dias is a 64 y.o. female.     Chief Complaint   Patient presents with    Dysuria     Patient would like to discuss the UA and urine culture results from 5/5    Tick Bite     Tick bite on scalp        History of Present Illness   She is here today to discuss bladder pressure and dysuria.  Symptoms started approximately a week ago.  She had been out riding her horse and wearing a feminine pad and thought she may have contracted an infection.  She notes pressure in the bladder, worse with urination.  She tried pyridium with some improvement. She denies any dysuria, frequency, urgency, vaginal pain, vaginal bleeding, vaginal discharge. She called her PCP who ordered urinalysis with culture, negative for infection. She notes improvement in symptoms this week.  She notes she recently started a new protein powder a few weeks ago and does not know if this is contributing to symptoms.  She notes she drinks a lot of caffeine and eats a lot of spicy foods.  She has a history of frequent UTIs.  She is currently seeing a uro-GYN, Dr. Staton, scheduled for follow up in July.     She also notes a tick bite on her head.  She found the tick this morning on the scalp.  She was able to pull off the tick.  She thinks it was on the scalp for less than 24 hours.  She notes a small red bump where the tick bit her.  She denies any rash, fever, chills, joint pain.    The following portions of the patient's history were reviewed and updated as appropriate: allergies, current medications, past family history, past medical history, past social history, past surgical history, and problem list.    Review of Systems   Constitutional: Negative.    Respiratory: Negative.     Cardiovascular: Negative.    Genitourinary:  Positive for pelvic pressure. Negative for decreased urine volume, difficulty urinating, dysuria, flank pain, frequency, hematuria, pelvic pain, urgency, urinary incontinence, vaginal bleeding, vaginal  discharge and vaginal pain.   Skin:  Positive for skin lesions.   Psychiatric/Behavioral: Negative.         Objective   Physical Exam  Constitutional:       Appearance: She is well-developed.   Neck:      Thyroid: No thyroid mass, thyromegaly or thyroid tenderness.      Vascular: No carotid bruit.      Trachea: Trachea normal.   Cardiovascular:      Rate and Rhythm: Normal rate and regular rhythm.      Chest Wall: PMI is not displaced.      Pulses:           Radial pulses are 2+ on the right side and 2+ on the left side.        Dorsalis pedis pulses are 2+ on the right side and 2+ on the left side.        Posterior tibial pulses are 2+ on the right side and 2+ on the left side.      Heart sounds: S1 normal and S2 normal.   Pulmonary:      Effort: Pulmonary effort is normal.      Breath sounds: Normal breath sounds.   Abdominal:      General: Bowel sounds are normal. There is no distension or abdominal bruit. There are no signs of injury.      Palpations: Abdomen is soft. There is no hepatomegaly or splenomegaly.      Tenderness: There is abdominal tenderness in the suprapubic area. There is no right CVA tenderness, left CVA tenderness, guarding or rebound.      Hernia: No hernia is present.   Musculoskeletal:      Right lower leg: No edema.      Left lower leg: No edema.   Lymphadenopathy:      Head:      Right side of head: No submental, submandibular, tonsillar or occipital adenopathy.      Left side of head: No submental, submandibular, tonsillar or occipital adenopathy.      Cervical: No cervical adenopathy.   Skin:     General: Skin is warm and dry.      Capillary Refill: Capillary refill takes less than 2 seconds.      Nails: There is no clubbing.      Comments: Tick bite present right scalp. No evidence of rash, drainage, erythema.    Neurological:      Mental Status: She is alert and oriented to person, place, and time.   Psychiatric:         Attention and Perception: Attention normal.         Mood and  Affect: Mood and affect normal.         Speech: Speech normal.         Behavior: Behavior normal.         Thought Content: Thought content normal.         Cognition and Memory: Cognition normal.         Vitals:    05/08/25 0919   BP: 116/68   Pulse: 85   Temp: 97.7 °F (36.5 °C)   SpO2: 100%      Body mass index is 23.83 kg/m².    Assessment & Plan   Problems Addressed this Visit    None  Visit Diagnoses         Sensation of pressure in bladder area    -  Primary      Tick bite of scalp, initial encounter              Diagnoses         Codes Comments      Sensation of pressure in bladder area    -  Primary ICD-10-CM: R39.89  ICD-9-CM: 596.89       Tick bite of scalp, initial encounter     ICD-10-CM: S00.06XA, W57.XXXA  ICD-9-CM: 910.4, E906.4           1.  Bladder pressure-she is not having any symptoms of vaginal candidiasis and UTI workup was negative.  Discussed with her today that symptoms may be associated with interstitial cystitis.  Recommend that she stop the new protein powder and I have given her information on foods and beverages to avoid with IC.  Recommend hydrating well fluids.  Encouraged her to continue routine follow-up with urogynecology.  Notify for any changes in symptoms.  2.  Tick bite of scalp-no evidence of erythema migrans or infection.  Can use topical antibiotic ointment and as needed hydrocortisone as needed.  Encouraged her to wear protective clothing and insect repellent when working outside.

## 2025-05-23 RX ORDER — PAROXETINE 10 MG/1
10 TABLET, FILM COATED ORAL DAILY
Qty: 90 TABLET | Refills: 0 | Status: SHIPPED | OUTPATIENT
Start: 2025-05-23

## 2025-05-27 ENCOUNTER — OFFICE VISIT (OUTPATIENT)
Dept: INTERNAL MEDICINE | Facility: CLINIC | Age: 65
End: 2025-05-27
Payer: MEDICARE

## 2025-05-27 VITALS
HEIGHT: 66 IN | OXYGEN SATURATION: 98 % | DIASTOLIC BLOOD PRESSURE: 86 MMHG | HEART RATE: 79 BPM | BODY MASS INDEX: 23.78 KG/M2 | TEMPERATURE: 97.8 F | SYSTOLIC BLOOD PRESSURE: 132 MMHG | WEIGHT: 148 LBS

## 2025-05-27 DIAGNOSIS — F32.A ANXIETY AND DEPRESSION: ICD-10-CM

## 2025-05-27 DIAGNOSIS — R73.01 IFG (IMPAIRED FASTING GLUCOSE): ICD-10-CM

## 2025-05-27 DIAGNOSIS — Z00.00 WELCOME TO MEDICARE PREVENTIVE VISIT: Primary | ICD-10-CM

## 2025-05-27 DIAGNOSIS — E78.2 MIXED HYPERLIPIDEMIA: ICD-10-CM

## 2025-05-27 DIAGNOSIS — F41.9 ANXIETY AND DEPRESSION: ICD-10-CM

## 2025-05-27 RX ORDER — BUPROPION HYDROCHLORIDE 75 MG/1
75 TABLET ORAL DAILY
Qty: 14 TABLET | Refills: 0 | Status: SHIPPED | OUTPATIENT
Start: 2025-05-27

## 2025-05-27 RX ORDER — PAROXETINE 10 MG/1
10 TABLET, FILM COATED ORAL DAILY
Qty: 90 TABLET | Refills: 0 | Status: SHIPPED | OUTPATIENT
Start: 2025-05-27

## 2025-05-27 NOTE — PROGRESS NOTES
Subjective   Shavonne Dias is a 64 y.o. female.   Chief Complaint   Patient presents with    Depression    Hyperlipidemia    Hyperglycemia    Welcome To Medicare       Depression    Symptoms: no suicidal ideas      Nighttime awakenings:     PMH Includes: depression    Hyperlipidemia    Hyperglycemia       Hyperlipidemia-on atorvastatin at 10 mg a day, she takes it every day.  No side effects.  No muscle aches, no muscle cramps.  She has no personal history of heart disease.    , HDL 55.  LFTs normal.  She is a former smoker.  She smoked for about 7 years 1 to 2 packs/day.  She quit in 1985.     Depression/anxiety- she was diagnosed in 1994.  She was started on Paxil 10 mg and it helped.  She tried to go off 2 times and had to go back on it.  In 2018 Wellbutrin  mg was added.  It helped.  She continues to takes Paxil and Wellbutrin, but she started weaning off Wellbutrin about 2 weeks ago.  She feels that she does not need it anymore and it gives her vivid dreams.  She takes Wellbutrin  mg every other day for about 10 days.  She wants to continue weaning off.  She reports good mood.  No excessive worries, no irritability.  No suicidal ideations, no aggressive behaviors. PHQ-9 at 0, ALEJANDRO-7 at 0.     Hyperglycemia - fasting blood sugar 100 from 105 from 105.  A1c 6.1.  BMI 23.5 from 24.3.    Review of Systems   Respiratory: Negative.     Cardiovascular: Negative.    Psychiatric/Behavioral: Negative.  Negative for suicidal ideas.          Objective   Wt Readings from Last 3 Encounters:   05/27/25 67.1 kg (148 lb)   05/08/25 68 kg (149 lb 14.4 oz)   04/17/25 69.4 kg (153 lb)      Vitals:    05/27/25 1143   BP: 132/86   Pulse: 79   Temp: 97.8 °F (36.6 °C)   SpO2: 98%     Temp Readings from Last 3 Encounters:   05/27/25 97.8 °F (36.6 °C)   05/08/25 97.7 °F (36.5 °C)   04/17/25 98.2 °F (36.8 °C) (Infrared)     BP Readings from Last 3 Encounters:   05/27/25 132/86   05/08/25 116/68   04/17/25 115/78      Pulse Readings from Last 3 Encounters:   05/27/25 79   05/08/25 85   04/17/25 78     Body mass index is 23.53 kg/m².    Physical Exam  Constitutional:       Appearance: She is well-developed.   Neck:      Vascular: No carotid bruit.   Cardiovascular:      Rate and Rhythm: Normal rate and regular rhythm.      Heart sounds: Normal heart sounds.   Pulmonary:      Effort: Pulmonary effort is normal.      Breath sounds: Normal breath sounds.   Skin:     General: Skin is warm and dry.   Neurological:      Mental Status: She is alert.   Psychiatric:         Behavior: Behavior normal.         Assessment & Plan   Diagnoses and all orders for this visit:    1. Welcome to Medicare preventive visit (Primary)    2. Mixed hyperlipidemia  Assessment & Plan:                3. IFG (impaired fasting glucose)    4. Anxiety and depression    Other orders  -     buPROPion (WELLBUTRIN) 75 MG tablet; Take 1 tablet by mouth Daily.  Dispense: 14 tablet; Refill: 0        Hyperlipidemia-continue current treatment.  Follow-up in 6 months.    Impaired fasting glucose-increased risk for developing diabetes.  Limit carbs and sweets.  Information on prediabetic diet added to discharge summary.  Follow-up in 6 months.    Depression/anxiety-patient will continue paroxetine.  Will continue weaning off Wellbutrin. We are stopping Wellbutrin XL.  We are prescribing Wellbutrin IR 75 mg.  Patient will take half tablet daily for 2 weeks and then she can stop it.  Follow-up in 6 months, sooner if any problems.          BMI is within normal parameters. No other follow-up for BMI required.

## 2025-05-27 NOTE — PATIENT INSTRUCTIONS
Medicare Wellness  Personal Prevention Plan of Service     Date of Office Visit:    Encounter Provider:  Maria Victoria Harman MD  Place of Service:  [facility]  Patient Name: Shavonne Dias  :  1960    As part of the Medicare Wellness portion of your visit today, we are providing you with this personalized preventive plan of services (PPPS). This plan is based upon recommendations of the [place] Preventive Services Task Force (USPSTF) and the Advisory Committee on Immunization Practices (ACIP).    This lists the preventive care services that should be considered, and provides dates of when you are due. Items listed as completed are up-to-date and do not require any further intervention.    Health Maintenance   Topic Date Due    ZOSTER VACCINE (1 of 2) Never done    ANNUAL WELLNESS VISIT  Never done    Pneumococcal Vaccine 50+ (2 of 2 - PCV) 2020    COVID-19 Vaccine (4 - - season) 06/10/2025 (Originally 2024)    INFLUENZA VACCINE  2025    DXA SCAN  09/15/2025    COLORECTAL CANCER SCREENING  2026    LIPID PANEL  2026    TDAP/TD VACCINES (2 - Td or Tdap) 2026    HEPATITIS C SCREENING  Completed       No orders of the defined types were placed in this encounter.      Return in about 6 months (around 2025).

## 2025-05-27 NOTE — PROGRESS NOTES
Subjective   The ABCs of the Annual Wellness Visit  Medicare Wellness Visit      Shavonne Dias is a 64 y.o. patient who presents for a Medicare Wellness Visit.    The following portions of the patient's history were reviewed and   updated as appropriate: allergies, current medications, past family history, past medical history, past social history, past surgical history, and problem list.    Compared to one year ago, the patient's physical   health is better.  Compared to one year ago, the patient's mental   health is better.    Recent Hospitalizations:  She was not admitted to the hospital during the last year.     Current Medical Providers:  Patient Care Team:  Maria Victoria Harman MD as PCP - General (Family Medicine)  Godfrey Soria MD as Consulting Physician (Dermatology)  Dorota Young MD as Referring Physician (Internal Medicine)  Lam Harrison MD as Consulting Physician (Hematology and Oncology)  Michael Maldonado MD as Consulting Physician (Gastroenterology)  Anthony Mendoza MD as Consulting Physician (Urology)  Poonam Reese MD as Consulting Physician (Obstetrics)  Jose Rosenberg MD as Consulting Physician (Colon and Rectal Surgery)    Outpatient Medications Prior to Visit   Medication Sig Dispense Refill    atorvastatin (LIPITOR) 10 MG tablet Take 1 tablet by mouth Daily. 90 tablet 1    cetirizine (zyrTEC) 10 MG tablet Take 1 tablet by mouth Daily.      Chlorcyclizine-Pseudoephed (Stahist AD) 25-60 MG tablet Take 1 tablet by mouth Every 8 (Eight) Hours As Needed (congestion. may substitute benadryl at night). 30 tablet 0    Cholecalciferol (VITAMIN D) 1000 units tablet Take 1 tablet by mouth Daily.      Multiple Vitamins-Minerals (MULTI-VITAMIN GUMMIES PO) Take  by mouth.      PARoxetine (PAXIL) 10 MG tablet Take 1 tablet by mouth Daily. 90 tablet 0    buPROPion XL (WELLBUTRIN XL) 150 MG 24 hr tablet Take 1 tablet by mouth Every Morning. 90 tablet 1     No facility-administered  "medications prior to visit.     No opioid medication identified on active medication list. I have reviewed chart for other potential  high risk medication/s and harmful drug interactions in the elderly.      Aspirin is not on active medication list.  Aspirin use is not indicated based on review of current medical condition/s. Risk of harm outweighs potential benefits.  .  No coronary artery disease, no history of stroke, no known peripheral vascular disease.    Patient Active Problem List   Diagnosis    Absence of breast    Estrogen receptor positive tumor status    Primary malignant neoplasm of female breast    Monopolar depression    Thrombocytosis after splenectomy    History of colon polyps    Hyperlipemia    Chronic fatigue    Slow transit constipation    Gluten intolerance    Anxiety    Family history of heart disease    Chronic right-sided low back pain with right-sided sciatica    Mood disorder    Allergic rhinitis    Colon polyp    Family history of colonic polyps    Hyperglycemia     Advance Care Planning Advance Directive is not on file.  ACP discussion was held with the patient during this visit. Patient has an advance directive (not in EMR), copy requested.            Objective   Vitals:    05/27/25 1143   BP: 132/86   BP Location: Left arm   Patient Position: Sitting   Cuff Size: Adult   Pulse: 79   Temp: 97.8 °F (36.6 °C)   SpO2: 98%   Weight: 67.1 kg (148 lb)   Height: 168.9 cm (66.5\")   PainSc: 0-No pain       Estimated body mass index is 23.53 kg/m² as calculated from the following:    Height as of this encounter: 168.9 cm (66.5\").    Weight as of this encounter: 67.1 kg (148 lb).    BMI is within normal parameters. No other follow-up for BMI required.      Gait and Balance Evaluation:  Normal         Gait and Balance Evaluation:  Normal    Does the patient have evidence of cognitive impairment? No  Lab Results   Component Value Date    CHLPL 193 05/05/2025    TRIG 77 05/05/2025    HDL 55 " 2025     (H) 2025    VLDL 14 2025    HGBA1C 6.10 (H) 2025                                                                                               Health  Risk Assessment    Smoking Status:  Social History     Tobacco Use   Smoking Status Former    Current packs/day: 0.00    Average packs/day: 0.3 packs/day for 10.0 years (2.5 ttl pk-yrs)    Types: Cigarettes    Start date: 1975    Quit date: 1985    Years since quittin.3   Smokeless Tobacco Never     Alcohol Consumption:  Social History     Substance and Sexual Activity   Alcohol Use Yes    Comment: Moderate       Fall Risk Screen  STEADI Fall Risk Assessment was completed, and patient is at MODERATE risk for falls. Assessment completed on:2025    Depression Screening   Little interest or pleasure in doing things? Not at all   Feeling down, depressed, or hopeless? Not at all   PHQ-2 Total Score 0      Health Habits and Functional and Cognitive Screenin/27/2025    12:00 PM   Functional & Cognitive Status   Do you have difficulty preparing food and eating? No   Do you have difficulty bathing yourself, getting dressed or grooming yourself? No   Do you have difficulty using the toilet? No   Do you have difficulty moving around from place to place? No   Do you have trouble with steps or getting out of a bed or a chair? No   Current Diet Well Balanced Diet   Dental Exam Up to date   Eye Exam Up to date   Exercise (times per week) 3 times per week   Current Exercises Include Other   Do you need help using the phone?  No   Are you deaf or do you have serious difficulty hearing?  No   Do you need help to go to places out of walking distance? No   Do you need help shopping? No   Do you need help preparing meals?  No   Do you need help with housework?  No   Do you need help with laundry? No   Do you need help taking your medications? No   Do you need help managing money? No   Do you ever drive or ride in a car  without wearing a seat belt? No   Have you felt unusual stress, anger or loneliness in the last month? No   Who do you live with? Spouse   If you need help, do you have trouble finding someone available to you? No   Have you been bothered in the last four weeks by sexual problems? No   Do you have difficulty concentrating, remembering or making decisions? No           Visual Acuity:  Vision Screening    Right eye Left eye Both eyes   Without correction      With correction 20/20 20/20 20/20     Age-appropriate Screening Schedule:  Refer to the list below for future screening recommendations based on patient's age, sex and/or medical conditions. Orders for these recommended tests are listed in the plan section. The patient has been provided with a written plan.    Health Maintenance List  Health Maintenance   Topic Date Due    ZOSTER VACCINE (1 of 2) Never done    ANNUAL WELLNESS VISIT  Never done    Pneumococcal Vaccine 50+ (2 of 2 - PCV) 07/05/2020    COVID-19 Vaccine (4 - 2024-25 season) 06/10/2025 (Originally 9/1/2024)    INFLUENZA VACCINE  07/01/2025    DXA SCAN  09/15/2025    COLORECTAL CANCER SCREENING  04/06/2026    LIPID PANEL  05/05/2026    TDAP/TD VACCINES (2 - Td or Tdap) 06/20/2026    HEPATITIS C SCREENING  Completed                                                                                                                                                CMS Preventative Services Quick Reference  Risk Factors Identified During Encounter  Immunizations Discussed/Encouraged: Tdap, Prevnar 20 (Pneumococcal 20-valent conjugate), Shingrix, and COVID19    Information on healthy heart diet added to discharge summary.  Information on exercise to stay healthy added to discharge summary.  Information on fall prevention added to discharge summary.  Patient is up-to-date with colon cancer screening.  No mammograms- she is post bilateral mastectomy.  Discussed with patient increased risk for cancer with any  alcohol use.  Recommendations for vaccinations including Prevnar 20, Shingrix, Tdap and COVID-vaccine.  Patient prefers not to get Prevnar 20 today.  She plans not to get any more COVID vaccinations.  She has a living will at home and I asked her to bring it to next appointment.  Continue regular dental appointments at least every 6 months.    The above risks/problems have been discussed with the patient.  Pertinent information has been shared with the patient in the After Visit Summary.  An After Visit Summary and PPPS were made available to the patient.    Follow Up:   Next Medicare Wellness visit to be scheduled in 1 year.     Assessment & Plan  Welcome to Medicare preventive visit         Mixed hyperlipidemia            IFG (impaired fasting glucose)         Anxiety and depression                Follow Up:   Return in about 6 months (around 11/27/2025).

## 2025-07-01 RX ORDER — PAROXETINE 10 MG/1
10 TABLET, FILM COATED ORAL DAILY
Qty: 90 TABLET | Refills: 0 | Status: SHIPPED | OUTPATIENT
Start: 2025-07-01

## (undated) DEVICE — SENSR O2 OXIMAX FNGR A/ 18IN NONSTR

## (undated) DEVICE — TUBING, SUCTION, 1/4" X 10', STRAIGHT: Brand: MEDLINE

## (undated) DEVICE — LN SMPL CO2 SHTRM SD STREAM W/M LUER

## (undated) DEVICE — KT ORCA ORCAPOD DISP STRL

## (undated) DEVICE — ADAPT CLN BIOGUARD AIR/H2O DISP

## (undated) DEVICE — THE TORRENT IRRIGATION SCOPE CONNECTOR IS USED WITH THE TORRENT IRRIGATION TUBING TO PROVIDE IRRIGATION FLUIDS SUCH AS STERILE WATER DURING GASTROINTESTINAL ENDOSCOPIC PROCEDURES WHEN USED IN CONJUNCTION WITH AN IRRIGATION PUMP (OR ELECTROSURGICAL UNIT).: Brand: TORRENT

## (undated) DEVICE — CANN O2 ETCO2 FITS ALL CONN CO2 SMPL A/ 7IN DISP LF